# Patient Record
Sex: FEMALE | Race: WHITE | NOT HISPANIC OR LATINO | Employment: UNEMPLOYED | URBAN - METROPOLITAN AREA
[De-identification: names, ages, dates, MRNs, and addresses within clinical notes are randomized per-mention and may not be internally consistent; named-entity substitution may affect disease eponyms.]

---

## 2017-01-10 ENCOUNTER — ROUTINE PRENATAL (OUTPATIENT)
Dept: OBSTETRICS AND GYNECOLOGY | Facility: CLINIC | Age: 42
End: 2017-01-10

## 2017-01-10 VITALS — WEIGHT: 198 LBS | DIASTOLIC BLOOD PRESSURE: 70 MMHG | BODY MASS INDEX: 32.95 KG/M2 | SYSTOLIC BLOOD PRESSURE: 112 MMHG

## 2017-01-10 DIAGNOSIS — Z34.83 PRENATAL CARE, SUBSEQUENT PREGNANCY, THIRD TRIMESTER: Primary | ICD-10-CM

## 2017-01-10 LAB — EXTERNAL GROUP B STREP ANTIGEN: NEGATIVE

## 2017-01-10 PROCEDURE — 99213 OFFICE O/P EST LOW 20 MIN: CPT | Performed by: OBSTETRICS & GYNECOLOGY

## 2017-01-10 RX ORDER — LANSOPRAZOLE 30 MG/1
30 CAPSULE, DELAYED RELEASE ORAL DAILY
COMMUNITY
Start: 2016-12-31 | End: 2017-07-25

## 2017-01-10 NOTE — MR AVS SNAPSHOT
Rebekah Saucedo   1/10/2017 1:10 PM   Routine Prenatal    Dept Phone:  [phone]   Encounter #:  [id number]    Provider:  Tucker Huffman MD   Department:  [facility] [place]                Your Full Care Plan              Today's Medication Changes          These changes are accurate as of: 1/10/17  1:50 PM.  If you have any questions, ask your nurse or doctor.               Stop taking medication(s)listed here:     famotidine 20 MG tablet   Commonly known as:  PEPCID                      Your Updated Medication List          This list is accurate as of: 1/10/17  1:50 PM.  Always use your most recent med list.                ferrous sulfate 325 (65 FE) MG tablet   Take 1 tablet by mouth Daily With Breakfast.       lansoprazole 30 MG capsule   Commonly known as:  PREVACID       psyllium 58.6 % packet   Commonly known as:  METAMUCIL               You Were Diagnosed With        Codes Comments    Prenatal care, subsequent pregnancy, third trimester    -  Primary ICD-10-CM: Z34.83  ICD-9-CM: V22.1       Instructions     None    Patient Instructions History      Upcoming Appointments     Visit Type Date Time Department    OB FOLLOWUP 1/10/2017  1:10 PM [facility]      MyChart Signup     Our records indicate that you have declined [facility] iSentiumt signup. If you would like to sign up for My eShoe, please email [facility] [email or web address] or call [phone] to obtain an activation code.             Other Info from Your Visit           Allergies     No Known Allergies      Reason for Visit     Routine Prenatal Visit     Contractions fluid leaking earlier today      Vital Signs     Blood Pressure Weight Last Menstrual Period Body Mass Index Smoking Status       112/70 198 lb (89.8 kg) 05/07/2016 (Exact Date) 32.95 kg/m2 Current Some Day Smoker       Problems and Diagnoses Noted     Prenatal care    -  Primary

## 2017-01-10 NOTE — PROGRESS NOTES
Chief Complaint   Patient presents with   • Routine Prenatal Visit   • Contractions     fluid leaking earlier today       HPI: Rebekah is a  currently at 35w3d who today reports the following:  Contractions - YES - but less than 4/hour AND no associated change in vaginal discharge; Leaking - No; Vaginal bleeding -  No; Heartburn - No. Some discharge vs YOLANDA?    ROS:  Vitals: See prenatal flowsheet   GI: Nausea - No ; Constipation - No; Diarrhea - past week responded to Immodium   Neuro: Headache - No ; Visual change - No      EXAM:  Abdomen: See prenatal flowsheet   Urine glucose/protein: See prenatal flowsheet   Pelvic: See prenatal flowsheet     Lab Results   Component Value Date    ABO A 10/04/2016    RH Positive 10/04/2016    ABSCRN Negative 10/04/2016       MDM:  Impression: Supervision of pregnancy   Tests done today: GBS testing   Topics discussed: labor   Tests next visit: none   Next visit: History of rapid labor with 4th delivery; 9 lbs other children- will induce at 39 weeks also AMA

## 2017-01-17 ENCOUNTER — ROUTINE PRENATAL (OUTPATIENT)
Dept: OBSTETRICS AND GYNECOLOGY | Facility: CLINIC | Age: 42
End: 2017-01-17

## 2017-01-17 VITALS — WEIGHT: 199 LBS | DIASTOLIC BLOOD PRESSURE: 60 MMHG | SYSTOLIC BLOOD PRESSURE: 112 MMHG | BODY MASS INDEX: 33.12 KG/M2

## 2017-01-17 DIAGNOSIS — Z34.82 ENCOUNTER FOR SUPERVISION OF OTHER NORMAL PREGNANCY IN SECOND TRIMESTER: Primary | ICD-10-CM

## 2017-01-17 PROCEDURE — 99213 OFFICE O/P EST LOW 20 MIN: CPT | Performed by: OBSTETRICS & GYNECOLOGY

## 2017-01-17 NOTE — MR AVS SNAPSHOT
Rebekah Saucedo   1/17/2017 1:20 PM   Routine Prenatal    Dept Phone:  [phone]   Encounter #:  [id number]    Provider:  Tucker Huffman MD   Department:  [facility] [place]                Your Full Care Plan              Your Updated Medication List          This list is accurate as of: 1/17/17  1:54 PM.  Always use your most recent med list.                ferrous sulfate 325 (65 FE) MG tablet   Take 1 tablet by mouth Daily With Breakfast.       lansoprazole 30 MG capsule   Commonly known as:  PREVACID       psyllium 58.6 % packet   Commonly known as:  METAMUCIL               We Performed the Following     Group B Strep Culture       You Were Diagnosed With        Codes Comments    Encounter for supervision of other normal pregnancy in second trimester    -  Primary ICD-10-CM: Z34.82       Instructions     None    Patient Instructions History      Upcoming Appointments     Visit Type Date Time Department    OB FOLLOWUP 1/17/2017  1:20 PM [facility]      MyChart Signup     Our records indicate that you have declined [facility] LoopPayhart signup. If you would like to sign up for LightSquaredt, please email [facility] [email or web address] [email or web address] or call [phone] to obtain an activation code.             Other Info from Your Visit           Allergies     No Known Allergies      Reason for Visit     Routine Prenatal Visit itching on arms and shoulders    Contractions irreg      Vital Signs     Blood Pressure Weight Last Menstrual Period Body Mass Index Smoking Status       112/60 199 lb (90.3 kg) 05/07/2016 (Exact Date) 33.12 kg/m2 Current Some Day Smoker       Problems and Diagnoses Noted     Prenatal care      Results     Group B Strep Culture      Component    External Strep Group B Ag    Negative

## 2017-01-17 NOTE — PROGRESS NOTES
Chief Complaint   Patient presents with   • Routine Prenatal Visit     itching on arms and shoulders   • Contractions     irreg       HPI: Rebekah is a  currently at 36w3d who today reports the following: arms are itching - similar to last pregnancy   Contractions - YES - but less than 4/hour AND no associated change in vaginal discharge; Leaking - No; Vaginal bleeding -  No; Swelling of extremities - No.    ROS:  GI: Nausea - No; Constipation - No; Diarrhea - No    Neuro: Headache - No; Visual change - No      EXAM:  Vitals: See prenatal flowsheet   Abdomen: See prenatal flowsheet   Urine glucose/protein: See prenatal flowsheet   Pelvic: See prenatal flowsheet   MDM:  Impression: 1. Supervision of pregnancy  2. AMA   Tests done today: none   Topics discussed: 1. Continue with PNV's  2. Prenatal labs reviewed  3. labor signs and symptoms  4. pain management options for labor   Tests next visit: none   Next visit:  we'll discuss induction around 39 weeks which would be  so probably have to wait until the sixth Monday

## 2017-01-24 ENCOUNTER — ROUTINE PRENATAL (OUTPATIENT)
Dept: OBSTETRICS AND GYNECOLOGY | Facility: CLINIC | Age: 42
End: 2017-01-24

## 2017-01-24 VITALS — DIASTOLIC BLOOD PRESSURE: 60 MMHG | WEIGHT: 197 LBS | SYSTOLIC BLOOD PRESSURE: 112 MMHG | BODY MASS INDEX: 32.78 KG/M2

## 2017-01-24 DIAGNOSIS — L29.9 PRURITUS: ICD-10-CM

## 2017-01-24 DIAGNOSIS — O09.523 AMA (ADVANCED MATERNAL AGE) MULTIGRAVIDA 35+, THIRD TRIMESTER: Primary | ICD-10-CM

## 2017-01-24 DIAGNOSIS — Z34.82 ENCOUNTER FOR SUPERVISION OF OTHER NORMAL PREGNANCY IN SECOND TRIMESTER: ICD-10-CM

## 2017-01-24 PROCEDURE — 99213 OFFICE O/P EST LOW 20 MIN: CPT | Performed by: OBSTETRICS & GYNECOLOGY

## 2017-01-24 NOTE — MR AVS SNAPSHOT
Rebekah Saucedo   1/24/2017 11:20 AM   Routine Prenatal    Dept Phone:  [phone]   Encounter #:  [id number]    Provider:  Tucker Huffman MD   Department:  [facility] [place]                Your Full Care Plan              Your Updated Medication List          This list is accurate as of: 1/24/17 12:20 PM.  Always use your most recent med list.                ferrous sulfate 325 (65 FE) MG tablet   Take 1 tablet by mouth Daily With Breakfast.       lansoprazole 30 MG capsule   Commonly known as:  PREVACID       psyllium 58.6 % packet   Commonly known as:  METAMUCIL               You Were Diagnosed With        Codes Comments    AMA (advanced maternal age) multigravida 35+, third trimester    -  Primary ICD-10-CM: O09.523  ICD-9-CM: 659.63     Encounter for supervision of other normal pregnancy in second trimester     ICD-10-CM: Z34.82     Pruritus     ICD-10-CM: L29.9  ICD-9-CM: 698.9       Instructions     None    Patient Instructions History      Upcoming Appointments     Visit Type Date Time Department    OB FOLLOWUP 1/24/2017 11:20 AM [facility]      MyChart Signup     Our records indicate that you have declined [facility] TerraEchost signup. If you would like to sign up for [email or web address], please email [facility] [email or web address] or call [phone] to obtain an activation code.             Other Info from Your Visit           Allergies     No Known Allergies      Reason for Visit     Routine Prenatal Visit Itching all over / pelvic pressure /     Contractions           Vital Signs     Blood Pressure Weight Last Menstrual Period Body Mass Index Smoking Status       112/60 197 lb (89.4 kg) 05/07/2016 (Exact Date) 32.78 kg/m2 Current Some Day Smoker       Problems and Diagnoses Noted     AMA (advanced maternal age) multigravida 35+    Prenatal care    Itchy skin

## 2017-01-24 NOTE — PROGRESS NOTES
Chief Complaint   Patient presents with   • Routine Prenatal Visit     Itching all over / pelvic pressure /    • Contractions       HPI: Rebekah is a  currently at 37w3d who today reports the following:  Contractions - YES - but less than 4/hour AND no associated change in vaginal discharge; Leaking - No; Vaginal bleeding -  No; Swelling of extremities - YES hands mostly    ROS:                                   She is itching more now than before , also pressure   GI: Nausea - No; Constipation - No; Diarrhea - No    Neuro: Headache - No; Visual change - No      EXAM:  Vitals: See prenatal flowsheet   Abdomen: See prenatal flowsheet   Urine glucose/protein: See prenatal flowsheet   Pelvic: See prenatal flowsheet   MDM:  Impression: 1. Supervision of pregnancy  2. AMA  3. pruritis no rash good FM   Tests done today: bile salts   Topics discussed: 1. Continue with PNV's  2. Prenatal labs reviewed  3. itching    Tests next visit: none   Next visit: Okay to use benadryl - call if decreased Fm

## 2017-01-27 ENCOUNTER — TELEPHONE (OUTPATIENT)
Dept: OBSTETRICS AND GYNECOLOGY | Facility: CLINIC | Age: 42
End: 2017-01-27

## 2017-01-27 RX ORDER — METFORMIN HYDROCHLORIDE 750 MG/1
750 TABLET, EXTENDED RELEASE ORAL
Qty: 30 TABLET | Refills: 1 | Status: SHIPPED | OUTPATIENT
Start: 2017-01-27 | End: 2017-03-16

## 2017-01-27 NOTE — TELEPHONE ENCOUNTER
----- Message from Rafat Velazquez sent at 1/27/2017  1:24 PM EST -----  Contact: [phone]  WAS CALLING FOR TEST RESULTS.       [phone] advised patient Bile acids (11) are normal. Patient states she has been on Benadryl and Aveeno/oatmeal baths since Tuesday and it's not helping.

## 2017-01-27 NOTE — TELEPHONE ENCOUNTER
I electronically prescribed metformin 750 mg.numbers 30 with 1 refill.  Article suggested that this might be helpful.  Should not lower her blood sugars enough to be a problem.  I left a voicemail with the patient.

## 2017-01-29 ENCOUNTER — RESULTS ENCOUNTER (OUTPATIENT)
Dept: OBSTETRICS AND GYNECOLOGY | Facility: CLINIC | Age: 42
End: 2017-01-29

## 2017-01-29 DIAGNOSIS — Z34.82 ENCOUNTER FOR SUPERVISION OF OTHER NORMAL PREGNANCY IN SECOND TRIMESTER: ICD-10-CM

## 2017-01-29 DIAGNOSIS — L29.9 PRURITUS: ICD-10-CM

## 2017-01-31 ENCOUNTER — ROUTINE PRENATAL (OUTPATIENT)
Dept: OBSTETRICS AND GYNECOLOGY | Facility: CLINIC | Age: 42
End: 2017-01-31

## 2017-01-31 VITALS — BODY MASS INDEX: 33.12 KG/M2 | SYSTOLIC BLOOD PRESSURE: 110 MMHG | WEIGHT: 199 LBS | DIASTOLIC BLOOD PRESSURE: 60 MMHG

## 2017-01-31 DIAGNOSIS — L29.9 ITCHING: ICD-10-CM

## 2017-01-31 DIAGNOSIS — O09.523 AMA (ADVANCED MATERNAL AGE) MULTIGRAVIDA 35+, THIRD TRIMESTER: Primary | ICD-10-CM

## 2017-01-31 PROCEDURE — 99213 OFFICE O/P EST LOW 20 MIN: CPT | Performed by: OBSTETRICS & GYNECOLOGY

## 2017-01-31 RX ORDER — OXYTOCIN/RINGER'S LACTATE 20/1000 ML
999 PLASTIC BAG, INJECTION (ML) INTRAVENOUS ONCE
Status: CANCELLED | OUTPATIENT
Start: 2017-01-31 | End: 2017-01-31

## 2017-01-31 RX ORDER — PROMETHAZINE HYDROCHLORIDE 25 MG/ML
12.5 INJECTION, SOLUTION INTRAMUSCULAR; INTRAVENOUS EVERY 6 HOURS PRN
Status: CANCELLED | OUTPATIENT
Start: 2017-01-31

## 2017-01-31 RX ORDER — PROMETHAZINE HYDROCHLORIDE 25 MG/1
12.5 SUPPOSITORY RECTAL EVERY 6 HOURS PRN
Status: CANCELLED | OUTPATIENT
Start: 2017-01-31

## 2017-01-31 RX ORDER — METHYLERGONOVINE MALEATE 0.2 MG/ML
200 INJECTION INTRAVENOUS AS NEEDED
Status: CANCELLED | OUTPATIENT
Start: 2017-01-31

## 2017-01-31 RX ORDER — TERBUTALINE SULFATE 1 MG/ML
0.25 INJECTION, SOLUTION SUBCUTANEOUS AS NEEDED
Status: CANCELLED | OUTPATIENT
Start: 2017-01-31

## 2017-01-31 RX ORDER — ACETAMINOPHEN 325 MG/1
650 TABLET ORAL EVERY 4 HOURS PRN
Status: CANCELLED | OUTPATIENT
Start: 2017-01-31

## 2017-01-31 RX ORDER — ONDANSETRON 2 MG/ML
4 INJECTION INTRAMUSCULAR; INTRAVENOUS EVERY 6 HOURS PRN
Status: CANCELLED | OUTPATIENT
Start: 2017-01-31

## 2017-01-31 RX ORDER — HYDROCODONE BITARTRATE AND ACETAMINOPHEN 10; 325 MG/1; MG/1
2 TABLET ORAL EVERY 4 HOURS PRN
Status: CANCELLED | OUTPATIENT
Start: 2017-01-31 | End: 2017-02-10

## 2017-01-31 RX ORDER — OXYTOCIN/RINGER'S LACTATE 20/1000 ML
125 PLASTIC BAG, INJECTION (ML) INTRAVENOUS AS NEEDED
Status: CANCELLED | OUTPATIENT
Start: 2017-01-31 | End: 2017-02-01

## 2017-01-31 RX ORDER — LIDOCAINE HYDROCHLORIDE 10 MG/ML
5 INJECTION, SOLUTION INFILTRATION; PERINEURAL AS NEEDED
Status: CANCELLED | OUTPATIENT
Start: 2017-01-31

## 2017-01-31 RX ORDER — SODIUM CHLORIDE 0.9 % (FLUSH) 0.9 %
1-10 SYRINGE (ML) INJECTION AS NEEDED
Status: CANCELLED | OUTPATIENT
Start: 2017-01-31

## 2017-01-31 RX ORDER — PROMETHAZINE HYDROCHLORIDE 25 MG/1
12.5 TABLET ORAL EVERY 6 HOURS PRN
Status: CANCELLED | OUTPATIENT
Start: 2017-01-31

## 2017-01-31 RX ORDER — SODIUM CHLORIDE, SODIUM LACTATE, POTASSIUM CHLORIDE, CALCIUM CHLORIDE 600; 310; 30; 20 MG/100ML; MG/100ML; MG/100ML; MG/100ML
125 INJECTION, SOLUTION INTRAVENOUS CONTINUOUS
Status: CANCELLED | OUTPATIENT
Start: 2017-01-31

## 2017-01-31 RX ORDER — BUTORPHANOL TARTRATE 1 MG/ML
1 INJECTION, SOLUTION INTRAMUSCULAR; INTRAVENOUS
Status: CANCELLED | OUTPATIENT
Start: 2017-01-31

## 2017-01-31 RX ORDER — ONDANSETRON 4 MG/1
4 TABLET, FILM COATED ORAL EVERY 6 HOURS PRN
Status: CANCELLED | OUTPATIENT
Start: 2017-01-31

## 2017-01-31 RX ORDER — CARBOPROST TROMETHAMINE 250 UG/ML
250 INJECTION, SOLUTION INTRAMUSCULAR AS NEEDED
Status: CANCELLED | OUTPATIENT
Start: 2017-01-31

## 2017-01-31 RX ORDER — HYDROCODONE BITARTRATE AND ACETAMINOPHEN 5; 325 MG/1; MG/1
1 TABLET ORAL EVERY 4 HOURS PRN
Status: CANCELLED | OUTPATIENT
Start: 2017-01-31 | End: 2017-02-10

## 2017-01-31 RX ORDER — MISOPROSTOL 100 UG/1
800 TABLET ORAL AS NEEDED
Status: CANCELLED | OUTPATIENT
Start: 2017-01-31

## 2017-01-31 RX ORDER — BUTORPHANOL TARTRATE 1 MG/ML
2 INJECTION, SOLUTION INTRAMUSCULAR; INTRAVENOUS
Status: CANCELLED | OUTPATIENT
Start: 2017-01-31

## 2017-01-31 RX ORDER — OXYTOCIN/RINGER'S LACTATE 30/500 ML
2-24 PLASTIC BAG, INJECTION (ML) INTRAVENOUS
Status: CANCELLED | OUTPATIENT
Start: 2017-01-31

## 2017-01-31 NOTE — PROGRESS NOTES
Chief Complaint   Patient presents with   • Routine Prenatal Visit   • Contractions     irreg       HPI: Rebekah is a  currently at 38w3d who today reports the following: Still itching.  She only got her medicine yesterday and took 1 pill because her pharmacy did not have it.  Her bile salts/acids were relatively normal range.  She is feeling more pressure.  Contractions - No; Leaking - No; Vaginal bleeding -  No; Swelling of extremities - No.    ROS:  GI: Nausea - No; Constipation - No; Diarrhea - No    Neuro: Headache - No; Visual change - No      EXAM:  Vitals: See prenatal flowsheet   Abdomen: See prenatal flowsheet   Urine glucose/protein: See prenatal flowsheet   Pelvic: See prenatal flowsheet   MDM:  Impression: 1. Supervision of pregnancy  2. Possible cholestasis of pregnancy with normal bile salts  3. AMA   Tests done today: none   Topics discussed: 1. Continue with PNV's  2. Prenatal labs reviewed  3. labor signs and symptoms   Tests next visit: none   Next visit:  we'll check her cervix.  She is deathly changes from last week.

## 2017-01-31 NOTE — MR AVS SNAPSHOT
Rebekah Saucedo   1/31/2017 11:10 AM   Routine Prenatal    Dept Phone:  [phone]   Encounter #:  [id number]    Provider:  Tucker Huffman MD   Department:  [facility]                Your Full Care Plan              Your Updated Medication List          This list is accurate as of: 1/31/17 12:06 PM.  Always use your most recent med list.                ferrous sulfate 325 (65 FE) MG tablet   Take 1 tablet by mouth Daily With Breakfast.       lansoprazole 30 MG capsule   Commonly known as:  PREVACID       metFORMIN  MG 24 hr tablet   Commonly known as:  GLUCOPHAGE XR   Take 1 tablet by mouth Daily With Breakfast.       psyllium 58.6 % packet   Commonly known as:  METAMUCIL               You Were Diagnosed With        Codes Comments    AMA (advanced maternal age) multigravida 35+, third trimester    -  Primary ICD-10-CM: O09.523  ICD-9-CM: 659.63     Itching     ICD-10-CM: L29.9  ICD-9-CM: 698.9       Instructions     None    Patient Instructions History      Upcoming Appointments     Visit Type Date Time Department    OB FOLLOWUP 1/31/2017 11:10 AM [facility]      MyChart Signup     Our records indicate that you have declined [facility] [employer] signup. If you would like to sign up for [employer], please email [email or web address] or call [phone] to obtain an activation code.             Other Info from Your Visit           Allergies     No Known Allergies      Reason for Visit     Routine Prenatal Visit     Contractions irreg      Vital Signs     Blood Pressure Weight Last Menstrual Period Body Mass Index Smoking Status       110/60 199 lb (90.3 kg) 05/07/2016 (Exact Date) 33.12 kg/m2 Current Some Day Smoker       Problems and Diagnoses Noted     AMA (advanced maternal age) multigravida 35+    Itching          No Longer an Issue     Prenatal care

## 2017-02-03 ENCOUNTER — HOSPITAL ENCOUNTER (OUTPATIENT)
Facility: HOSPITAL | Age: 42
Setting detail: OBSERVATION
Discharge: HOME | End: 2017-02-03
Attending: OBSTETRICS & GYNECOLOGY | Admitting: OBSTETRICS & GYNECOLOGY

## 2017-02-03 VITALS
HEART RATE: 83 BPM | TEMPERATURE: 98.2 F | DIASTOLIC BLOOD PRESSURE: 72 MMHG | HEIGHT: 65 IN | WEIGHT: 199 LBS | BODY MASS INDEX: 33.15 KG/M2 | SYSTOLIC BLOOD PRESSURE: 122 MMHG

## 2017-02-03 LAB
ALP SERPL-CCNC: 198 U/L (ref 25–100)
ALT SERPL W P-5'-P-CCNC: 32 U/L (ref 7–40)
AST SERPL-CCNC: 29 U/L (ref 0–33)
BILIRUB SERPL-MCNC: 0.4 MG/DL (ref 0.3–1.2)
CREAT BLD-MCNC: 0.6 MG/DL (ref 0.6–1.3)
DEPRECATED RDW RBC AUTO: 43.3 FL (ref 37–54)
ERYTHROCYTE [DISTWIDTH] IN BLOOD BY AUTOMATED COUNT: 13.7 % (ref 11.3–14.5)
HCT VFR BLD AUTO: 35.3 % (ref 34.5–44)
HGB BLD-MCNC: 11.7 G/DL (ref 11.5–15.5)
LDH SERPL-CCNC: 202 U/L (ref 120–246)
MCH RBC QN AUTO: 28.7 PG (ref 27–31)
MCHC RBC AUTO-ENTMCNC: 33.1 G/DL (ref 32–36)
MCV RBC AUTO: 86.7 FL (ref 80–99)
PLATELET # BLD AUTO: 198 10*3/MM3 (ref 150–450)
PMV BLD AUTO: 10.5 FL (ref 6–12)
RBC # BLD AUTO: 4.07 10*6/MM3 (ref 3.89–5.14)
URATE SERPL-MCNC: 4.7 MG/DL (ref 3.1–7.8)
WBC NRBC COR # BLD: 10.68 10*3/MM3 (ref 3.5–10.8)

## 2017-02-03 PROCEDURE — 59025 FETAL NON-STRESS TEST: CPT | Performed by: OBSTETRICS & GYNECOLOGY

## 2017-02-03 PROCEDURE — 99218 PR INITIAL OBSERVATION CARE/DAY 30 MINUTES: CPT | Performed by: OBSTETRICS & GYNECOLOGY

## 2017-02-03 NOTE — PROGRESS NOTES
[facility]  Obstetric History and Physical    Chief Complaint   Patient presents with   • Contractions       Subjective     Patient is a 41 y.o. female  currently at 38w6d, who presents with an of possible leaking fluid this morning, without vaginal bleeding, abdominal pain, and reports normal fetal activity.  She admits to having some GI symptoms, diarrhea,occ nausea and denies fever.  Not any associated symptoms.  She states has had some generalized pruritus, negative bile acids    Her prenatal care is complicated by  advanced maternal age  ?.  Her previous obstetric/gynecological history is noted for is remarkable for .    The following portions of the patients history were reviewed and updated as appropriate: current medications, allergies, past medical history, past surgical history, past family history, past social history and problem list .       Prenatal Information:   Maternal Prenatal Labs  Blood Type No results found for: ABO   Rh Status No results found for: RH   Antibody Screen No results found for: ABSCRN   Gonnorhea No results found for: GCCX   Chlamydia No results found for: CLAMYDCU   RPR No results found for: RPR   Syphilis Antibody No results found for: SYPHILIS   Rubella No results found for: RUBELLAIGGIN   Hepatitis B Surface Antigen No results found for: HEPBSAG   HIV-1 Antibody No results found for: LABHIV1   Hepatitis C Antibody No results found for: HEPCAB   Rapid Urin Drug Screen No results found for: AMPMETHU, BARBITSCNUR, LABBENZSCN, LABMETHSCN, LABOPIASCN, THCURSCR, COCAINEUR, AMPHETSCREEN, PROPOXSCN, BUPRENORSCNU, METAMPSCNUR, OXYCODONESCN, TRICYCLICSCN   Group B Strep Culture No results found for: GBSANTIGEN           External Prenatal Results         Pregnancy Outside Results - these were transcribed from office records.  See scanned records for details. Date Time   Hgb      Hct      ABO      Rh      Antibody Screen      Glucose Fasting GTT      Glucose Tolerance Test 1 hour       Glucose Tolerance Test 3 hour      Gonorrhea (discrete)      Chlamydia (discrete)      RPR      VDRL      Syphillis Antibody      Rubella ^ Immune  16    HBsAg      Herpes Simplex Virus PCR      Herpes Simplex VIrus Culture      HIV ^ Negative  16    Hep C RNA Quant PCR      Hep C Antibody      Urine Drug Screen      AFP      Group B Strep ^ Negative  01/10/17    GBS Susceptibility to Clindamycin      GBS Susceptibility to Eythromycin      Fetal Fibronectin      Genetic Testing, Maternal Blood             Legend: ^: Historical            Past OB History:       Obstetric History       T7      TAB0   SAB1   E0   M0   L7       # Outcome Date GA Lbr Pasquale/2nd Weight Sex Delivery Anes PTL Lv   10 Current            9 Term 12 40w0d  7 lb (3.175 kg) F Vag-Spont   Y      Name: Araceli   8 AB 2008           7 Term 07 40w0d  8 lb 4 oz (3.742 kg) M Vag-Spont   Y      Name: Olu   6 Term 04 40w0d  7 lb 8 oz (3.402 kg) M Vag-Spont   Y      Name: Vicente   5 Term 01 40w0d  7 lb 1 oz (3.204 kg) F Vag-Spont  N Y      Name: Ruth   4 Term 99 40w0d  8 lb (3.629 kg) M Vag-Spont   Y      Name: Yoel   3 Term 11/15/96 40w0d  9 lb (4.082 kg) F Vag-Spont   Y      Name: Sonal   2 Term 95 40w0d  7 lb 3 oz (3.26 kg) F Vag-Spont   Y      Name: Angela   1 1992              Obstetric Comments   New paternity this pregnancy       Past Medical History: Past Medical History   Diagnosis Date   • Anemia    • Cervical spine fracture      with ex    • Endometriosis      10 years ago   • 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  She has a 2.00 pack-year smoking history. She does not have any smokeless tobacco history on file.   reports that she does not drink alcohol.   reports that she does not use illicit drugs.                   General ROS Negative Findings:Headaches, Visual Changes, Epigastric pain, Anorexia, Nausia/Vomiting and Vaginal Bleeding    ROS      Objective       Vital Signs Range for the last 24 hours  Temperature: Temp:  [98.2 °F (36.8 °C)] 98.2 °F (36.8 °C)   Temp Source: Temp src: Oral   BP: BP: (122)/(72) 122/72   Pulse: Heart Rate:  [83] 83   Respirations:     SPO2:     O2 Amount (l/min):     O2 Devices     Weight: Weight:  [199 lb (90.3 kg)] 199 lb (90.3 kg)     Physical Examination:   General:   alert, appears stated age and cooperative   Skin:   normal   HEENT:     Lungs:   clear to auscultation bilaterally   Heart:   regular rate and rhythm, S1, S2 normal, no murmur, click, rub or gallop   Abdomen:  soft, and uterus-nontender, no guarding, no rebound, negative CVA tenderness    Lower Extremeties    no edema, no calf tenderness, DTRs 2+ over 4, no clonus dorsalis pedis equal bilaterally    Pelvis:  External genitalia: normal general appearance  Vaginal: normal without tenderness, induration or masses  Uterus: enlarged                 Sterile speculum exam no pooling, negative nitrazine, negative ferning.      Presentation: vtx   Cervix: Exam by:     Dilation: Dilation: 3   Effacement: Cervical Effacement: 60%   Station: Station: -3       Fetal Heart Rate Assessment   Method:     Beats/min:     Baseline:     Varibility:     Accels:     Decels:     Tracing Category:     NST indications poss ROM, mod V, 130s reactive 15X15 no decelerations, onset  1018         Offset 1228, rare contractions  Uterine Assessment   Method: Method: TOCO (external toco transducer)   Frequency  (min):     Ctx Count in 10 min:     Duration:     Intensity:     Intensity by IUPC:     Resting Tone:     Resting Tone by IUPC:     [place] Units:       Laboratory Results: @erbtmkcs44@      Cbc pep normal  Radiology Review:@lastrad@  Other Studies:    Assessment/Plan     Active Problems:    AMA (advanced maternal age) multigravida 35+    Pregnancy    History of substance abuse        Assessment:  1.  Intrauterine pregnancy at 38w6d weeks gestation with reactive fetal status.    2.  No evidence of labor or rupture of membranes  3.  History of pruritus, normal bile acids, normal PEP, CBC  4.      Plan:  1. discharge to home  2. Plan of care has been reviewed with patient.  3.  Risks, benefits of treatment plan have been discussed.  4.  All questions have been answered.  5      Ted Golmdan DO  2/3/2017  12:30 PM

## 2017-02-03 NOTE — NURSING NOTE
1249-Discharge instructions given.  COme back for labor, SROM, decreased fetal movement, constant severe abdominal pain, bleeding that is more than spotting any other chagnes in maternal or fetal condition.  PT VU.  Questions answered denies need for WC ride.  Ambulatory to private vehicle

## 2017-02-06 ENCOUNTER — ANESTHESIA (OUTPATIENT)
Dept: LABOR AND DELIVERY | Facility: HOSPITAL | Age: 42
End: 2017-02-06

## 2017-02-06 ENCOUNTER — HOSPITAL ENCOUNTER (INPATIENT)
Facility: HOSPITAL | Age: 42
LOS: 2 days | Discharge: HOME | End: 2017-02-08
Attending: OBSTETRICS & GYNECOLOGY | Admitting: OBSTETRICS & GYNECOLOGY

## 2017-02-06 ENCOUNTER — ANESTHESIA EVENT (OUTPATIENT)
Dept: LABOR AND DELIVERY | Facility: HOSPITAL | Age: 42
End: 2017-02-06

## 2017-02-06 DIAGNOSIS — O09.523 AMA (ADVANCED MATERNAL AGE) MULTIGRAVIDA 35+, THIRD TRIMESTER: ICD-10-CM

## 2017-02-06 PROBLEM — Z34.90 TERM PREGNANCY: Status: ACTIVE | Noted: 2017-02-06

## 2017-02-06 LAB
ABO GROUP BLD: NORMAL
BLD GP AB SCN SERPL QL: NEGATIVE
DEPRECATED RDW RBC AUTO: 44.8 FL (ref 37–54)
DEPRECATED RDW RBC AUTO: 46.1 FL (ref 37–54)
ERYTHROCYTE [DISTWIDTH] IN BLOOD BY AUTOMATED COUNT: 14 % (ref 11.3–14.5)
ERYTHROCYTE [DISTWIDTH] IN BLOOD BY AUTOMATED COUNT: 14.1 % (ref 11.3–14.5)
HCT VFR BLD AUTO: 33.9 % (ref 34.5–44)
HCT VFR BLD AUTO: 35.1 % (ref 34.5–44)
HGB BLD-MCNC: 10.8 G/DL (ref 11.5–15.5)
HGB BLD-MCNC: 11.7 G/DL (ref 11.5–15.5)
MCH RBC QN AUTO: 28.4 PG (ref 27–31)
MCH RBC QN AUTO: 29.1 PG (ref 27–31)
MCHC RBC AUTO-ENTMCNC: 31.9 G/DL (ref 32–36)
MCHC RBC AUTO-ENTMCNC: 33.3 G/DL (ref 32–36)
MCV RBC AUTO: 87.3 FL (ref 80–99)
MCV RBC AUTO: 89.2 FL (ref 80–99)
PLATELET # BLD AUTO: 166 10*3/MM3 (ref 150–450)
PLATELET # BLD AUTO: 187 10*3/MM3 (ref 150–450)
PMV BLD AUTO: 10.2 FL (ref 6–12)
PMV BLD AUTO: 10.4 FL (ref 6–12)
RBC # BLD AUTO: 3.8 10*6/MM3 (ref 3.89–5.14)
RBC # BLD AUTO: 4.02 10*6/MM3 (ref 3.89–5.14)
RH BLD: POSITIVE
WBC NRBC COR # BLD: 10.13 10*3/MM3 (ref 3.5–10.8)
WBC NRBC COR # BLD: 12.22 10*3/MM3 (ref 3.5–10.8)

## 2017-02-06 PROCEDURE — 86850 RBC ANTIBODY SCREEN: CPT

## 2017-02-06 PROCEDURE — C1755 CATHETER, INTRASPINAL: HCPCS

## 2017-02-06 PROCEDURE — 25010000002 FENTANYL CITRATE (PF) 100 MCG/2ML SOLUTION: Performed by: NURSE ANESTHETIST, CERTIFIED REGISTERED

## 2017-02-06 PROCEDURE — 85027 COMPLETE CBC AUTOMATED: CPT | Performed by: OBSTETRICS & GYNECOLOGY

## 2017-02-06 PROCEDURE — 25010000002 ROPIVACAINE PER 1 MG: Performed by: NURSE ANESTHETIST, CERTIFIED REGISTERED

## 2017-02-06 PROCEDURE — 86901 BLOOD TYPING SEROLOGIC RH(D): CPT

## 2017-02-06 PROCEDURE — 59409 OBSTETRICAL CARE: CPT | Performed by: OBSTETRICS & GYNECOLOGY

## 2017-02-06 PROCEDURE — C1755 CATHETER, INTRASPINAL: HCPCS | Performed by: ANESTHESIOLOGY

## 2017-02-06 PROCEDURE — 86900 BLOOD TYPING SEROLOGIC ABO: CPT

## 2017-02-06 PROCEDURE — 10907ZC DRAINAGE OF AMNIOTIC FLUID, THERAPEUTIC FROM PRODUCTS OF CONCEPTION, VIA NATURAL OR ARTIFICIAL OPENING: ICD-10-PCS | Performed by: OBSTETRICS & GYNECOLOGY

## 2017-02-06 PROCEDURE — 25010000002 METHYLERGONOVINE MALEATE PER 0.2 MG: Performed by: OBSTETRICS & GYNECOLOGY

## 2017-02-06 PROCEDURE — 59025 FETAL NON-STRESS TEST: CPT

## 2017-02-06 RX ORDER — HYDROCODONE BITARTRATE AND ACETAMINOPHEN 5; 325 MG/1; MG/1
1 TABLET ORAL EVERY 4 HOURS PRN
Status: DISCONTINUED | OUTPATIENT
Start: 2017-02-06 | End: 2017-02-06 | Stop reason: HOSPADM

## 2017-02-06 RX ORDER — FAMOTIDINE 10 MG/ML
20 INJECTION, SOLUTION INTRAVENOUS ONCE AS NEEDED
Status: DISCONTINUED | OUTPATIENT
Start: 2017-02-06 | End: 2017-02-06 | Stop reason: HOSPADM

## 2017-02-06 RX ORDER — CARBOPROST TROMETHAMINE 250 UG/ML
250 INJECTION, SOLUTION INTRAMUSCULAR AS NEEDED
Status: DISCONTINUED | OUTPATIENT
Start: 2017-02-06 | End: 2017-02-06 | Stop reason: HOSPADM

## 2017-02-06 RX ORDER — MISOPROSTOL 200 UG/1
600 TABLET ORAL ONCE
Status: DISCONTINUED | OUTPATIENT
Start: 2017-02-06 | End: 2017-02-06

## 2017-02-06 RX ORDER — ONDANSETRON 4 MG/1
4 TABLET, FILM COATED ORAL EVERY 6 HOURS PRN
Status: DISCONTINUED | OUTPATIENT
Start: 2017-02-06 | End: 2017-02-06

## 2017-02-06 RX ORDER — PROMETHAZINE HYDROCHLORIDE 12.5 MG/1
12.5 SUPPOSITORY RECTAL EVERY 6 HOURS PRN
Status: DISCONTINUED | OUTPATIENT
Start: 2017-02-06 | End: 2017-02-06

## 2017-02-06 RX ORDER — PROMETHAZINE HYDROCHLORIDE 25 MG/ML
12.5 INJECTION, SOLUTION INTRAMUSCULAR; INTRAVENOUS EVERY 6 HOURS PRN
Status: DISCONTINUED | OUTPATIENT
Start: 2017-02-06 | End: 2017-02-08 | Stop reason: HOSPADM

## 2017-02-06 RX ORDER — OXYTOCIN/RINGER'S LACTATE 20/1000 ML
999 PLASTIC BAG, INJECTION (ML) INTRAVENOUS ONCE
Status: DISCONTINUED | OUTPATIENT
Start: 2017-02-06 | End: 2017-02-08 | Stop reason: HOSPADM

## 2017-02-06 RX ORDER — PROMETHAZINE HYDROCHLORIDE 12.5 MG/1
12.5 TABLET ORAL EVERY 6 HOURS PRN
Status: DISCONTINUED | OUTPATIENT
Start: 2017-02-06 | End: 2017-02-08 | Stop reason: HOSPADM

## 2017-02-06 RX ORDER — SODIUM CHLORIDE 0.9 % (FLUSH) 0.9 %
1-10 SYRINGE (ML) INJECTION AS NEEDED
Status: DISCONTINUED | OUTPATIENT
Start: 2017-02-06 | End: 2017-02-06

## 2017-02-06 RX ORDER — ACETAMINOPHEN 650 MG/1
650 SUPPOSITORY RECTAL EVERY 4 HOURS PRN
Status: DISCONTINUED | OUTPATIENT
Start: 2017-02-06 | End: 2017-02-06 | Stop reason: HOSPADM

## 2017-02-06 RX ORDER — PROMETHAZINE HYDROCHLORIDE 12.5 MG/1
12.5 SUPPOSITORY RECTAL EVERY 6 HOURS PRN
Status: DISCONTINUED | OUTPATIENT
Start: 2017-02-06 | End: 2017-02-06 | Stop reason: HOSPADM

## 2017-02-06 RX ORDER — OXYTOCIN/RINGER'S LACTATE 30/500 ML
2-24 PLASTIC BAG, INJECTION (ML) INTRAVENOUS
Status: DISCONTINUED | OUTPATIENT
Start: 2017-02-06 | End: 2017-02-06

## 2017-02-06 RX ORDER — KETOROLAC TROMETHAMINE 30 MG/ML
30 INJECTION, SOLUTION INTRAMUSCULAR; INTRAVENOUS EVERY 6 HOURS PRN
Status: DISCONTINUED | OUTPATIENT
Start: 2017-02-06 | End: 2017-02-06 | Stop reason: HOSPADM

## 2017-02-06 RX ORDER — METOCLOPRAMIDE HYDROCHLORIDE 5 MG/ML
10 INJECTION INTRAMUSCULAR; INTRAVENOUS ONCE AS NEEDED
Status: DISCONTINUED | OUTPATIENT
Start: 2017-02-06 | End: 2017-02-06 | Stop reason: HOSPADM

## 2017-02-06 RX ORDER — PROMETHAZINE HYDROCHLORIDE 25 MG/ML
12.5 INJECTION, SOLUTION INTRAMUSCULAR; INTRAVENOUS EVERY 6 HOURS PRN
Status: DISCONTINUED | OUTPATIENT
Start: 2017-02-06 | End: 2017-02-06

## 2017-02-06 RX ORDER — ONDANSETRON 2 MG/ML
4 INJECTION INTRAMUSCULAR; INTRAVENOUS EVERY 6 HOURS PRN
Status: DISCONTINUED | OUTPATIENT
Start: 2017-02-06 | End: 2017-02-06

## 2017-02-06 RX ORDER — MISOPROSTOL 200 UG/1
800 TABLET ORAL AS NEEDED
Status: DISCONTINUED | OUTPATIENT
Start: 2017-02-06 | End: 2017-02-06 | Stop reason: HOSPADM

## 2017-02-06 RX ORDER — METHYLERGONOVINE MALEATE 0.2 MG/ML
200 INJECTION INTRAVENOUS AS NEEDED
Status: DISCONTINUED | OUTPATIENT
Start: 2017-02-06 | End: 2017-02-08 | Stop reason: HOSPADM

## 2017-02-06 RX ORDER — LIDOCAINE HYDROCHLORIDE AND EPINEPHRINE 15; 5 MG/ML; UG/ML
INJECTION, SOLUTION EPIDURAL AS NEEDED
Status: DISCONTINUED | OUTPATIENT
Start: 2017-02-06 | End: 2017-02-06 | Stop reason: SURG

## 2017-02-06 RX ORDER — PROMETHAZINE HYDROCHLORIDE 25 MG/1
25 TABLET ORAL EVERY 6 HOURS PRN
Status: DISCONTINUED | OUTPATIENT
Start: 2017-02-06 | End: 2017-02-06 | Stop reason: HOSPADM

## 2017-02-06 RX ORDER — SODIUM CHLORIDE 0.9 % (FLUSH) 0.9 %
1-10 SYRINGE (ML) INJECTION AS NEEDED
Status: DISCONTINUED | OUTPATIENT
Start: 2017-02-06 | End: 2017-02-06 | Stop reason: HOSPADM

## 2017-02-06 RX ORDER — HYDROCODONE BITARTRATE AND ACETAMINOPHEN 5; 325 MG/1; MG/1
1 TABLET ORAL EVERY 4 HOURS PRN
Status: DISCONTINUED | OUTPATIENT
Start: 2017-02-06 | End: 2017-02-08 | Stop reason: HOSPADM

## 2017-02-06 RX ORDER — PRENATAL WITH FERROUS FUM AND FOLIC ACID 3080; 920; 120; 400; 22; 1.84; 3; 20; 10; 1; 12; 200; 27; 25; 2 [IU]/1; [IU]/1; MG/1; [IU]/1; MG/1; MG/1; MG/1; MG/1; MG/1; MG/1; UG/1; MG/1; MG/1; MG/1; MG/1
1 TABLET ORAL DAILY
Status: DISCONTINUED | OUTPATIENT
Start: 2017-02-06 | End: 2017-02-06 | Stop reason: HOSPADM

## 2017-02-06 RX ORDER — HYDROCODONE BITARTRATE AND ACETAMINOPHEN 7.5; 325 MG/1; MG/1
1 TABLET ORAL EVERY 4 HOURS PRN
Status: DISCONTINUED | OUTPATIENT
Start: 2017-02-06 | End: 2017-02-06 | Stop reason: HOSPADM

## 2017-02-06 RX ORDER — FERROUS SULFATE 325(65) MG
325 TABLET ORAL
Status: DISCONTINUED | OUTPATIENT
Start: 2017-02-07 | End: 2017-02-08 | Stop reason: HOSPADM

## 2017-02-06 RX ORDER — FAMOTIDINE 10 MG/ML
20 INJECTION, SOLUTION INTRAVENOUS ONCE AS NEEDED
Status: DISCONTINUED | OUTPATIENT
Start: 2017-02-06 | End: 2017-02-06

## 2017-02-06 RX ORDER — BISACODYL 10 MG
10 SUPPOSITORY, RECTAL RECTAL DAILY PRN
Status: DISCONTINUED | OUTPATIENT
Start: 2017-02-07 | End: 2017-02-06 | Stop reason: HOSPADM

## 2017-02-06 RX ORDER — ONDANSETRON 2 MG/ML
4 INJECTION INTRAMUSCULAR; INTRAVENOUS EVERY 6 HOURS PRN
Status: DISCONTINUED | OUTPATIENT
Start: 2017-02-06 | End: 2017-02-06 | Stop reason: HOSPADM

## 2017-02-06 RX ORDER — PROMETHAZINE HYDROCHLORIDE 12.5 MG/1
12.5 TABLET ORAL EVERY 6 HOURS PRN
Status: DISCONTINUED | OUTPATIENT
Start: 2017-02-06 | End: 2017-02-06

## 2017-02-06 RX ORDER — ONDANSETRON 4 MG/1
4 TABLET, FILM COATED ORAL EVERY 6 HOURS PRN
Status: DISCONTINUED | OUTPATIENT
Start: 2017-02-06 | End: 2017-02-08 | Stop reason: HOSPADM

## 2017-02-06 RX ORDER — ONDANSETRON 4 MG/1
4 TABLET, FILM COATED ORAL EVERY 6 HOURS PRN
Status: DISCONTINUED | OUTPATIENT
Start: 2017-02-06 | End: 2017-02-06 | Stop reason: HOSPADM

## 2017-02-06 RX ORDER — ONDANSETRON 4 MG/1
4 TABLET, FILM COATED ORAL EVERY 6 HOURS PRN
Status: DISCONTINUED | OUTPATIENT
Start: 2017-02-06 | End: 2017-02-06 | Stop reason: SDUPTHER

## 2017-02-06 RX ORDER — ACETAMINOPHEN 325 MG/1
650 TABLET ORAL EVERY 4 HOURS PRN
Status: DISCONTINUED | OUTPATIENT
Start: 2017-02-06 | End: 2017-02-06

## 2017-02-06 RX ORDER — GUAIFENESIN 600 MG/1
600 TABLET, EXTENDED RELEASE ORAL EVERY 12 HOURS SCHEDULED
Status: DISCONTINUED | OUTPATIENT
Start: 2017-02-06 | End: 2017-02-08 | Stop reason: HOSPADM

## 2017-02-06 RX ORDER — OXYTOCIN/RINGER'S LACTATE 20/1000 ML
1000 PLASTIC BAG, INJECTION (ML) INTRAVENOUS CONTINUOUS
Status: ACTIVE | OUTPATIENT
Start: 2017-02-06 | End: 2017-02-06

## 2017-02-06 RX ORDER — EPHEDRINE SULFATE/0.9% NACL/PF 50 MG/10ML
10 SYRINGE (ML) INTRAVENOUS
Status: DISCONTINUED | OUTPATIENT
Start: 2017-02-06 | End: 2017-02-06 | Stop reason: HOSPADM

## 2017-02-06 RX ORDER — FENTANYL CITRATE 50 UG/ML
INJECTION, SOLUTION INTRAMUSCULAR; INTRAVENOUS AS NEEDED
Status: DISCONTINUED | OUTPATIENT
Start: 2017-02-06 | End: 2017-02-06 | Stop reason: SURG

## 2017-02-06 RX ORDER — PROMETHAZINE HYDROCHLORIDE 12.5 MG/1
12.5 TABLET ORAL EVERY 4 HOURS PRN
Status: DISCONTINUED | OUTPATIENT
Start: 2017-02-06 | End: 2017-02-06

## 2017-02-06 RX ORDER — PROMETHAZINE HYDROCHLORIDE 12.5 MG/1
12.5 TABLET ORAL EVERY 6 HOURS PRN
Status: DISCONTINUED | OUTPATIENT
Start: 2017-02-06 | End: 2017-02-06 | Stop reason: SDUPTHER

## 2017-02-06 RX ORDER — LIDOCAINE HYDROCHLORIDE 10 MG/ML
5 INJECTION, SOLUTION INFILTRATION; PERINEURAL AS NEEDED
Status: DISCONTINUED | OUTPATIENT
Start: 2017-02-06 | End: 2017-02-06 | Stop reason: HOSPADM

## 2017-02-06 RX ORDER — HYDROCODONE BITARTRATE AND ACETAMINOPHEN 5; 325 MG/1; MG/1
1 TABLET ORAL EVERY 4 HOURS PRN
Status: DISCONTINUED | OUTPATIENT
Start: 2017-02-06 | End: 2017-02-06

## 2017-02-06 RX ORDER — CARBOPROST TROMETHAMINE 250 UG/ML
250 INJECTION, SOLUTION INTRAMUSCULAR AS NEEDED
Status: DISCONTINUED | OUTPATIENT
Start: 2017-02-06 | End: 2017-02-08 | Stop reason: HOSPADM

## 2017-02-06 RX ORDER — ACETAMINOPHEN 325 MG/1
650 TABLET ORAL EVERY 4 HOURS PRN
Status: DISCONTINUED | OUTPATIENT
Start: 2017-02-06 | End: 2017-02-06 | Stop reason: HOSPADM

## 2017-02-06 RX ORDER — ZOLPIDEM TARTRATE 5 MG/1
5 TABLET ORAL NIGHTLY PRN
Status: DISCONTINUED | OUTPATIENT
Start: 2017-02-06 | End: 2017-02-06 | Stop reason: HOSPADM

## 2017-02-06 RX ORDER — DOCUSATE SODIUM 100 MG/1
100 CAPSULE, LIQUID FILLED ORAL 2 TIMES DAILY
Status: DISCONTINUED | OUTPATIENT
Start: 2017-02-06 | End: 2017-02-08 | Stop reason: HOSPADM

## 2017-02-06 RX ORDER — SODIUM CHLORIDE, SODIUM LACTATE, POTASSIUM CHLORIDE, CALCIUM CHLORIDE 600; 310; 30; 20 MG/100ML; MG/100ML; MG/100ML; MG/100ML
125 INJECTION, SOLUTION INTRAVENOUS CONTINUOUS
Status: DISCONTINUED | OUTPATIENT
Start: 2017-02-06 | End: 2017-02-06

## 2017-02-06 RX ORDER — HYDROCODONE BITARTRATE AND ACETAMINOPHEN 10; 325 MG/1; MG/1
2 TABLET ORAL EVERY 4 HOURS PRN
Status: DISCONTINUED | OUTPATIENT
Start: 2017-02-06 | End: 2017-02-08 | Stop reason: HOSPADM

## 2017-02-06 RX ORDER — BUTORPHANOL TARTRATE 1 MG/ML
1 INJECTION, SOLUTION INTRAMUSCULAR; INTRAVENOUS
Status: DISCONTINUED | OUTPATIENT
Start: 2017-02-06 | End: 2017-02-06

## 2017-02-06 RX ORDER — ONDANSETRON 2 MG/ML
4 INJECTION INTRAMUSCULAR; INTRAVENOUS EVERY 6 HOURS PRN
Status: DISCONTINUED | OUTPATIENT
Start: 2017-02-06 | End: 2017-02-06 | Stop reason: SDUPTHER

## 2017-02-06 RX ORDER — BISACODYL 10 MG
10 SUPPOSITORY, RECTAL RECTAL DAILY PRN
Status: DISCONTINUED | OUTPATIENT
Start: 2017-02-07 | End: 2017-02-08 | Stop reason: HOSPADM

## 2017-02-06 RX ORDER — OXYTOCIN/RINGER'S LACTATE 30/500 ML
2-24 PLASTIC BAG, INJECTION (ML) INTRAVENOUS
Status: DISCONTINUED | OUTPATIENT
Start: 2017-02-06 | End: 2017-02-06 | Stop reason: HOSPADM

## 2017-02-06 RX ORDER — TERBUTALINE SULFATE 1 MG/ML
0.25 INJECTION, SOLUTION SUBCUTANEOUS AS NEEDED
Status: DISCONTINUED | OUTPATIENT
Start: 2017-02-06 | End: 2017-02-06

## 2017-02-06 RX ORDER — IBUPROFEN 600 MG/1
600 TABLET ORAL EVERY 8 HOURS PRN
Status: DISCONTINUED | OUTPATIENT
Start: 2017-02-06 | End: 2017-02-08 | Stop reason: HOSPADM

## 2017-02-06 RX ORDER — PROMETHAZINE HYDROCHLORIDE 12.5 MG/1
12.5 SUPPOSITORY RECTAL EVERY 6 HOURS PRN
Status: DISCONTINUED | OUTPATIENT
Start: 2017-02-06 | End: 2017-02-08 | Stop reason: HOSPADM

## 2017-02-06 RX ORDER — ONDANSETRON 2 MG/ML
4 INJECTION INTRAMUSCULAR; INTRAVENOUS ONCE AS NEEDED
Status: DISCONTINUED | OUTPATIENT
Start: 2017-02-06 | End: 2017-02-06 | Stop reason: HOSPADM

## 2017-02-06 RX ORDER — LANOLIN 100 %
OINTMENT (GRAM) TOPICAL
Status: DISCONTINUED | OUTPATIENT
Start: 2017-02-06 | End: 2017-02-08 | Stop reason: HOSPADM

## 2017-02-06 RX ORDER — MISOPROSTOL 200 UG/1
800 TABLET ORAL AS NEEDED
Status: DISCONTINUED | OUTPATIENT
Start: 2017-02-06 | End: 2017-02-08 | Stop reason: HOSPADM

## 2017-02-06 RX ORDER — DIPHENHYDRAMINE HYDROCHLORIDE 50 MG/ML
12.5 INJECTION INTRAMUSCULAR; INTRAVENOUS EVERY 8 HOURS PRN
Status: DISCONTINUED | OUTPATIENT
Start: 2017-02-06 | End: 2017-02-06 | Stop reason: HOSPADM

## 2017-02-06 RX ORDER — OXYTOCIN/RINGER'S LACTATE 20/1000 ML
125 PLASTIC BAG, INJECTION (ML) INTRAVENOUS AS NEEDED
Status: DISCONTINUED | OUTPATIENT
Start: 2017-02-06 | End: 2017-02-06 | Stop reason: HOSPADM

## 2017-02-06 RX ORDER — ONDANSETRON 2 MG/ML
4 INJECTION INTRAMUSCULAR; INTRAVENOUS EVERY 6 HOURS PRN
Status: DISCONTINUED | OUTPATIENT
Start: 2017-02-06 | End: 2017-02-08 | Stop reason: HOSPADM

## 2017-02-06 RX ORDER — ZOLPIDEM TARTRATE 5 MG/1
5 TABLET ORAL NIGHTLY PRN
Status: DISCONTINUED | OUTPATIENT
Start: 2017-02-06 | End: 2017-02-08 | Stop reason: HOSPADM

## 2017-02-06 RX ORDER — PROMETHAZINE HYDROCHLORIDE 25 MG/ML
12.5 INJECTION, SOLUTION INTRAMUSCULAR; INTRAVENOUS EVERY 6 HOURS PRN
Status: DISCONTINUED | OUTPATIENT
Start: 2017-02-06 | End: 2017-02-06 | Stop reason: SDUPTHER

## 2017-02-06 RX ORDER — IBUPROFEN 600 MG/1
600 TABLET ORAL EVERY 8 HOURS PRN
Status: DISCONTINUED | OUTPATIENT
Start: 2017-02-07 | End: 2017-02-06 | Stop reason: HOSPADM

## 2017-02-06 RX ORDER — HYDROCODONE BITARTRATE AND ACETAMINOPHEN 10; 325 MG/1; MG/1
2 TABLET ORAL EVERY 4 HOURS PRN
Status: DISCONTINUED | OUTPATIENT
Start: 2017-02-06 | End: 2017-02-06 | Stop reason: SDUPTHER

## 2017-02-06 RX ORDER — HYDROCODONE BITARTRATE AND ACETAMINOPHEN 10; 325 MG/1; MG/1
2 TABLET ORAL EVERY 4 HOURS PRN
Status: DISCONTINUED | OUTPATIENT
Start: 2017-02-06 | End: 2017-02-06

## 2017-02-06 RX ORDER — OXYTOCIN/RINGER'S LACTATE 20/1000 ML
999 PLASTIC BAG, INJECTION (ML) INTRAVENOUS ONCE
Status: DISCONTINUED | OUTPATIENT
Start: 2017-02-06 | End: 2017-02-06 | Stop reason: HOSPADM

## 2017-02-06 RX ORDER — PSEUDOEPHEDRINE HCL 30 MG
30 TABLET ORAL EVERY 6 HOURS PRN
Status: DISCONTINUED | OUTPATIENT
Start: 2017-02-06 | End: 2017-02-08 | Stop reason: HOSPADM

## 2017-02-06 RX ORDER — METHYLERGONOVINE MALEATE 0.2 MG/ML
200 INJECTION INTRAVENOUS AS NEEDED
Status: DISCONTINUED | OUTPATIENT
Start: 2017-02-06 | End: 2017-02-06 | Stop reason: HOSPADM

## 2017-02-06 RX ORDER — SODIUM CHLORIDE 0.9 % (FLUSH) 0.9 %
1-10 SYRINGE (ML) INJECTION AS NEEDED
Status: DISCONTINUED | OUTPATIENT
Start: 2017-02-06 | End: 2017-02-08 | Stop reason: HOSPADM

## 2017-02-06 RX ORDER — TERBUTALINE SULFATE 1 MG/ML
0.25 INJECTION, SOLUTION SUBCUTANEOUS AS NEEDED
Status: DISCONTINUED | OUTPATIENT
Start: 2017-02-06 | End: 2017-02-06 | Stop reason: HOSPADM

## 2017-02-06 RX ORDER — METHYLERGONOVINE MALEATE 0.2 MG/ML
200 INJECTION INTRAVENOUS ONCE
Status: DISCONTINUED | OUTPATIENT
Start: 2017-02-06 | End: 2017-02-06

## 2017-02-06 RX ORDER — FAMOTIDINE 20 MG/1
20 TABLET, FILM COATED ORAL ONCE AS NEEDED
Status: DISCONTINUED | OUTPATIENT
Start: 2017-02-06 | End: 2017-02-06

## 2017-02-06 RX ORDER — OXYTOCIN/RINGER'S LACTATE 20/1000 ML
125 PLASTIC BAG, INJECTION (ML) INTRAVENOUS AS NEEDED
Status: ACTIVE | OUTPATIENT
Start: 2017-02-06 | End: 2017-02-07

## 2017-02-06 RX ORDER — IBUPROFEN 600 MG/1
600 TABLET ORAL EVERY 8 HOURS PRN
Status: DISCONTINUED | OUTPATIENT
Start: 2017-02-06 | End: 2017-02-06

## 2017-02-06 RX ORDER — SODIUM CHLORIDE, SODIUM LACTATE, POTASSIUM CHLORIDE, CALCIUM CHLORIDE 600; 310; 30; 20 MG/100ML; MG/100ML; MG/100ML; MG/100ML
125 INJECTION, SOLUTION INTRAVENOUS CONTINUOUS
Status: DISCONTINUED | OUTPATIENT
Start: 2017-02-06 | End: 2017-02-08 | Stop reason: HOSPADM

## 2017-02-06 RX ORDER — PROMETHAZINE HYDROCHLORIDE 25 MG/ML
12.5 INJECTION, SOLUTION INTRAMUSCULAR; INTRAVENOUS EVERY 4 HOURS PRN
Status: DISCONTINUED | OUTPATIENT
Start: 2017-02-06 | End: 2017-02-06

## 2017-02-06 RX ORDER — PROMETHAZINE HYDROCHLORIDE 12.5 MG/1
12.5 SUPPOSITORY RECTAL EVERY 6 HOURS PRN
Status: DISCONTINUED | OUTPATIENT
Start: 2017-02-06 | End: 2017-02-06 | Stop reason: SDUPTHER

## 2017-02-06 RX ORDER — LIDOCAINE HYDROCHLORIDE 10 MG/ML
5 INJECTION, SOLUTION INFILTRATION; PERINEURAL AS NEEDED
Status: DISCONTINUED | OUTPATIENT
Start: 2017-02-06 | End: 2017-02-08 | Stop reason: HOSPADM

## 2017-02-06 RX ORDER — ACETAMINOPHEN 325 MG/1
650 TABLET ORAL EVERY 4 HOURS PRN
Status: DISCONTINUED | OUTPATIENT
Start: 2017-02-06 | End: 2017-02-08 | Stop reason: HOSPADM

## 2017-02-06 RX ORDER — DOCUSATE SODIUM 100 MG/1
100 CAPSULE, LIQUID FILLED ORAL 2 TIMES DAILY
Status: DISCONTINUED | OUTPATIENT
Start: 2017-02-06 | End: 2017-02-06 | Stop reason: HOSPADM

## 2017-02-06 RX ORDER — OXYCODONE HYDROCHLORIDE AND ACETAMINOPHEN 5; 325 MG/1; MG/1
1 TABLET ORAL EVERY 4 HOURS PRN
Status: DISCONTINUED | OUTPATIENT
Start: 2017-02-06 | End: 2017-02-08 | Stop reason: HOSPADM

## 2017-02-06 RX ORDER — PROMETHAZINE HYDROCHLORIDE 25 MG/ML
12.5 INJECTION, SOLUTION INTRAMUSCULAR; INTRAVENOUS EVERY 6 HOURS PRN
Status: DISCONTINUED | OUTPATIENT
Start: 2017-02-06 | End: 2017-02-06 | Stop reason: HOSPADM

## 2017-02-06 RX ORDER — PANTOPRAZOLE SODIUM 40 MG/1
40 TABLET, DELAYED RELEASE ORAL
Status: DISCONTINUED | OUTPATIENT
Start: 2017-02-07 | End: 2017-02-08 | Stop reason: HOSPADM

## 2017-02-06 RX ORDER — CARBOPROST TROMETHAMINE 250 UG/ML
250 INJECTION, SOLUTION INTRAMUSCULAR ONCE
Status: DISCONTINUED | OUTPATIENT
Start: 2017-02-06 | End: 2017-02-06

## 2017-02-06 RX ORDER — LANOLIN 100 %
OINTMENT (GRAM) TOPICAL
Status: DISCONTINUED | OUTPATIENT
Start: 2017-02-06 | End: 2017-02-06 | Stop reason: HOSPADM

## 2017-02-06 RX ADMIN — HYDROCODONE BITARTRATE AND ACETAMINOPHEN 1 TABLET: 10; 325 TABLET ORAL at 20:41

## 2017-02-06 RX ADMIN — GUAIFENESIN 600 MG: 600 TABLET, EXTENDED RELEASE ORAL at 21:05

## 2017-02-06 RX ADMIN — ROPIVACAINE HYDROCHLORIDE 10 ML: 5 INJECTION, SOLUTION EPIDURAL; INFILTRATION; PERINEURAL at 10:45

## 2017-02-06 RX ADMIN — SODIUM CHLORIDE, POTASSIUM CHLORIDE, SODIUM LACTATE AND CALCIUM CHLORIDE 1000 ML: 600; 310; 30; 20 INJECTION, SOLUTION INTRAVENOUS at 10:15

## 2017-02-06 RX ADMIN — IBUPROFEN 600 MG: 600 TABLET ORAL at 15:01

## 2017-02-06 RX ADMIN — HYDROCORTISONE 2.5% 1 APPLICATION: 25 CREAM TOPICAL at 16:53

## 2017-02-06 RX ADMIN — FENTANYL CITRATE 100 MCG: 50 INJECTION, SOLUTION INTRAMUSCULAR; INTRAVENOUS at 10:43

## 2017-02-06 RX ADMIN — IBUPROFEN 600 MG: 600 TABLET ORAL at 22:14

## 2017-02-06 RX ADMIN — METHYLERGONOVINE MALEATE 200 MCG: 0.2 INJECTION INTRAMUSCULAR; INTRAVENOUS at 13:03

## 2017-02-06 RX ADMIN — SODIUM CHLORIDE, POTASSIUM CHLORIDE, SODIUM LACTATE AND CALCIUM CHLORIDE 125 ML/HR: 600; 310; 30; 20 INJECTION, SOLUTION INTRAVENOUS at 11:19

## 2017-02-06 RX ADMIN — OXYCODONE AND ACETAMINOPHEN 1 TABLET: 5; 325 TABLET ORAL at 16:23

## 2017-02-06 RX ADMIN — LIDOCAINE HYDROCHLORIDE AND EPINEPHRINE 3 ML: 15; 5 INJECTION, SOLUTION EPIDURAL at 10:40

## 2017-02-06 RX ADMIN — ROPIVACAINE HYDROCHLORIDE 16 ML/HR: 5 INJECTION, SOLUTION EPIDURAL; INFILTRATION; PERINEURAL at 10:46

## 2017-02-06 RX ADMIN — Medication 125 ML/HR: at 14:20

## 2017-02-06 RX ADMIN — DOCUSATE SODIUM 100 MG: 100 CAPSULE, LIQUID FILLED ORAL at 16:24

## 2017-02-06 RX ADMIN — Medication 2 MILLI-UNITS/MIN: at 08:51

## 2017-02-06 RX ADMIN — Medication 1000 ML/HR: at 12:50

## 2017-02-06 RX ADMIN — LIDOCAINE HYDROCHLORIDE AND EPINEPHRINE 2 ML: 15; 5 INJECTION, SOLUTION EPIDURAL at 10:43

## 2017-02-06 RX ADMIN — SODIUM CHLORIDE, POTASSIUM CHLORIDE, SODIUM LACTATE AND CALCIUM CHLORIDE 125 ML/HR: 600; 310; 30; 20 INJECTION, SOLUTION INTRAVENOUS at 08:51

## 2017-02-06 NOTE — PLAN OF CARE
Problem: Patient Care Overview (Adult)  Goal: Plan of Care Review  Outcome: Ongoing (interventions implemented as appropriate)    02/06/17 0938   Coping/Psychosocial Response Interventions   Plan Of Care Reviewed With patient;spouse;daughter   Patient Care Overview   Progress improving       Goal: Adult Individualization and Mutuality    02/06/17 0938   Individualization   Patient Specific Preferences none   Patient Specific Goals none   Patient Specific Interventions none   Mutuality/Individual Preferences   What Anxieties, Fears or Concerns Do You Have About Your Health or Care? none   What Questions Do You Have About Your Health or Care? none   What Information Would Help Us Give You More Personalized Care? none       Goal: Discharge Needs Assessment    02/06/17 0938   Discharge Needs Assessment   Concerns To Be Addressed no discharge needs identified   Equipment Needed After Discharge none   Discharge Disposition home or self-care   Self-Care   Equipment Currently Used at Home none   Living Environment   Transportation Available car         Problem: Labor (Cervical Ripen, Induct, Augment) (Adult,Obstetrics,Pediatric)  Goal: Signs and Symptoms of Listed Potential Problems Will be Absent or Manageable (Labor)  Outcome: Ongoing (interventions implemented as appropriate)    02/06/17 0938   Labor (Cervical Ripen, Induct, Augment)   Problems Assessed (Labor) pain;fetal well-being change;chorioamnionitis;intrapartum bleeding/hemorrhage;labor dystocia;malpresentation;precipitous delivery;shoulder dystocia;umbilical cord prolapse;uterine tachysystole   Problems Present (Labor) pain

## 2017-02-06 NOTE — ANESTHESIA PREPROCEDURE EVALUATION
Anesthesia Evaluation     Patient summary reviewed and Nursing notes reviewed    No history of anesthetic complications   Airway   Mallampati: II  TM distance: >3 FB  Neck ROM: full  no difficulty expected  Dental - normal exam     Pulmonary - negative pulmonary ROS   Cardiovascular - negative cardio ROS    Neuro/Psych- negative ROS  GI/Hepatic/Renal/Endo    (+)  GERD,     Musculoskeletal     (+) back pain,   Abdominal    Substance History       Comment: H/O drug abuse 4 years ago   OB/GYN    (+) Pregnant,         Other                           Anesthesia Plan    ASA 3     epidural     Anesthetic plan and risks discussed with patient.

## 2017-02-06 NOTE — H&P
[facility]  Obstetric History and Physical    Chief Complaint   Patient presents with   • Scheduled Induction       Subjective     Patient is a 41 y.o. female  currently at 39w2d, who presents with induction at term, favorable cervix and history of rapid labor.    Her prenatal care is complicated by  advanced maternal age  genetic screening was normal.  Her previous obstetric/gynecological history is noted for is non-contributory.    The following portions of the patients history were reviewed and updated as appropriate: current medications, allergies and past medical history .       Prenatal Information:  Prenatal Results         1st Trimester Ref. Range Date Time   CBC with auto diff ^ 12.5 / 36.0 / plt - 154   16    Rubella IgG ^ Immune   16    Hepatitis B SAg  Non-Reactive  Non-Reactive 16 1426   RPR  Non-Reactive  Non-Reactive 16 1426   ABO  A   17 0849   Rh  Positive   17 0849   Anibody Screen  Negative   17 0849   HIV ^ Negative   16    Varicella IgG       Urinalysis with microscopy       Urine Culture ^ negative   16    GC/Chlamydia/TV       ThinPrep/Pap ^ WNL with neg hpv   06/15/16    2nd and 3rd Trimester Ref. Range Date Time   Hemoglobin / Hematocrit  35.1 % 34.5 - 44.0 % 17 0830   Hemoglobin  11.7 g/dL 11.5 - 15.5 g/dL 17 0830   Group B Strep Culture ^ Negative   01/10/17    Glucose [place] Test 1 hour  133 mg/dL 65 - 199 mg/dL 10/20/16 1318   Glucose Tolerance Test 3 hours       Pre-eclampsia Panel  Abnormal  (A)  17 1110   Risk Screening Ref. Range Date Time   Fetal Fibronectin       Amnisure       Hepatitis C Antibody       Hemoglobin electrophoresis       Cystic Fibrosis       Hemoglobin A1C       MSAFP - 4       NIPT       AFP       Parvovirus IgG       Parvovirus IgM       POCT - glucose       [facility]       24 Hour urine - Total protein       24 Hour urine - Creatinine clearance       Urinalysis with microscopy        Urine Culture ^ negative   16    Drug Screening Ref. Range Date Time   Amphetamine Screen  Negative  Negative 16 1426   Barbiturate Screen  Negative  Negative 16 1426   Benzodiazepine Screen  Negative  Negative 16 1426   Methadone Screen  Negative  Negative 16 1426   Phencyclidine Screen  Negative  Negative 16 1426   Opiates Screen  Negative  Negative 16 1426   THC Screen  Negative  Negative 16 1426   Cocaine Screen  Negative  Negative 16 1426   Amphetamine Screen       Propoxyphene Screen  Negative  Negative 16 1426   Buprenorphine Screen       Methamphetamine Screen       Oxycodone Screen       Tryicyclic Antidepressants Screen  Negative  Negative 16 1426          Legend: ^: Historical            View all results for this pregnancy        External Prenatal Results         Pregnancy Outside Results - these were transcribed from office records.  See scanned records for details. Date Time   Hgb      Hct      ABO      Rh      Antibody Screen      Glucose Fasting GTT      Glucose Tolerance Test 1 hour      Glucose Tolerance Test 3 hour      Gonorrhea (discrete)      Chlamydia (discrete)      RPR      VDRL      Syphillis Antibody      Rubella ^ Immune  16    HBsAg      Herpes Simplex Virus PCR      Herpes Simplex VIrus Culture      HIV ^ Negative  16    Hep C RNA Quant PCR      Hep C Antibody      Urine Drug Screen      AFP      Group B Strep ^ Negative  01/10/17    GBS Susceptibility to Clindamycin      GBS Susceptibility to Eythromycin      Fetal Fibronectin      Genetic Testing, Maternal Blood             Legend: ^: Historical           Past OB History:     Obstetric History       T7      TAB0   SAB1   E0   M0   L7       # Outcome Date GA Lbr Pasquale/2nd Weight Sex Delivery Anes PTL Lv   10 Current            9 Term 12 40w0d  7 lb (3.175 kg) F Vag-Spont   Y      Name: Araceli   8 AB 2008           7 Term 07 40w0d   8 lb 4 oz (3.742 kg) M Vag-Spont   Y      Name: Olu   6 Term 07/25/04 40w0d  7 lb 8 oz (3.402 kg) M Vag-Spont   Y      Name: Vicente   5 Term 03/21/01 40w0d  7 lb 1 oz (3.204 kg) F Vag-Spont  N Y      Name: Ruth   4 Term 08/02/99 40w0d  8 lb (3.629 kg) M Vag-Spont   Y      Name: Yoel   3 Term 11/15/96 40w0d  9 lb (4.082 kg) F Vag-Spont   Y      Name: Sonal   2 Term 03/07/95 40w0d  7 lb 3 oz (3.26 kg) F Vag-Spont   Y      Name: Angela   1 SAB 1992              Obstetric Comments   New paternity this pregnancy       Past Medical History: Past Medical History   Diagnosis Date   • Anemia    • Cervical spine fracture      with ex    • Endometriosis      10 years ago   • Narcotic abuse in remission      IV drug abuse-4 years ago   • Urinary tract infection      not with this pregnancy   • Varicella       as a child      Past Surgical History Past Surgical History   Procedure Laterality Date   • Diagnostic laparoscopy     • Dilatation and evacuation     • [place] tooth extraction     • Endometrial biopsy        Family History: Family History   Problem Relation Age of Onset   • Heart disease Mother    • Lung cancer Mother    • Diabetes Paternal Uncle       Social History:  reports that she has been smoking.  She has a 2.00 pack-year smoking history. She does not have any smokeless tobacco history on file.   reports that she does not drink alcohol.   reports that she does not use illicit drugs.        General ROS: Pertinent items are noted in HPI    Objective       Vital Signs Range for the last 24 hours  Temperature: Temp:  [97.8 °F (36.6 °C)-97.9 °F (36.6 °C)] 97.8 °F (36.6 °C)   Temp Source: Temp src: Oral   BP: BP: (101-126)/(51-73) 116/73   Pulse: Heart Rate:  [75-90] 81   Respirations: Resp:  [16] 16   SPO2:     O2 Amount (l/min):     O2 Devices     Weight: Weight:  [199 lb (90.3 kg)] 199 lb (90.3 kg)     Physical Examination: General appearance - alert, well appearing, and in no distress    Presentation: vertex    Cervix: Exam by: Method: sterile exam per physician   Dilation: Dilation: 10   Effacement: Cervical Effacement: 100%   Station: Station: 2       Fetal Heart Rate Assessment   Method: Fetal HR Assessment Method: external   Beats/min: Fetal HR (Beats/Min): 150   Baseline: Fetal HR Baseline: normal range (110-160 bpm)   Varibility: Fetal HR Variability: moderate (amplitude range 6 to 25 bpm)   Accels: Fetal HR Accelerations: greater than/equal to 15 bpm, lasting at least 15 seconds   Decels: Fetal HR Decelerations: late, variable   Tracing Category:       Uterine Assessment   Method: Method: TOCO (external toco transducer)   Frequency (min): Contraction Frequency (min): 10   Ctx Count in 10 min:     Duration: Contraction Duration (sec): 60   Intensity: Contraction Intensity: mild by palpation   Intensity by IUPC:     Resting Tone: Uterine Resting Tone: soft by palpation   Resting Tone by IUPC:     [place] Units:       Assessment/Plan     Active Problems:    Pregnancy        Assessment:  1.  Intrauterine pregnancy at 39w2d weeks gestation with reactive fetal status.    2.  labor  with ROM  3.  Obstetrical history significant for is remarkable for grand multiparity and AMA  4.  GBS status: negative    Plan:  1. labor augmentation  Pitocin  2. Plan of care has been reviewed with patient and FOB  3.  Risks, benefits of treatment plan have been discussed.  4.  All questions have been answered.      Tucker Huffman MD  2/6/2017  1:12 PM

## 2017-02-06 NOTE — L&D DELIVERY NOTE
2017    Patient:Rebekah Saucedo    MR#:[id number]    Vaginal Delivery Note  41 y.o. yo female  at 39w2d    Patient Active Problem List   Diagnosis   • AMA (advanced maternal age) multigravida 35+   • Acid reflux   • Pregnancy   • History of substance abuse       Delivery     Delivery:       YOB: 2015    Time of Birth: 12:45 PM      Anesthesia:       Delivering clinician:      Forceps?   No   Vacuum? No    Shoulder dystocia present: No          Infant    Findings: male  infant     Infant observations: Weight: No birth weight on file.     Observations/Comments:         Apgars:    @ 1 minute /       @ 5 minutes         Placenta, Cord, and Fluid    Placenta delivered     at        Cord:    present.   Nuchal Cord?  no   Cord blood obtained:      Cord gases obtained:       Cord gas results: Pending         Repair    Episiotomy: No   Lacerations: Yes  Laceration Information  Laceration Repaired?   Perineal:         Periurethral:   1*   not required   Labial:         Sulcus:         Vaginal:         Cervical:            Estimated Blood Loss:    mls.   Suture used for repair: 3-0 Vicryl      Complications  none    Disposition  Mother to Mother Baby/Postpartum  in stable condition currently.  Baby to NBN  in stable condition currently.                Tcuker Huffman MD  17  1:02 PM

## 2017-02-06 NOTE — ANESTHESIA PROCEDURE NOTES
Labor Epidural    Patient location during procedure: OB  Performed By  Anesthesiologist: KODAK MO  CRNA: BJ NEWMAN  Preanesthetic Checklist  Completed: patient identified, surgical consent, pre-op evaluation, timeout performed, IV checked, risks and benefits discussed and monitors and equipment checked  Epidural Block Prep:  Pt Position:sitting  Sterile Tech:cap, gloves, mask and sterile barrier  Monitoring:blood pressure monitoring  Epidural Block Procedure:  Approach:midline  Guidance:palpation technique  Location:L4-L5  Needle Type:Tuohy  Needle Gauge:17 G  Cath Depth at skin:12 cm  Paresthesia: none  Aspiration:negative  Test Dose:negative  Post Assessment:  Dressing:occlusive dressing applied and secured with tape  Pt Tolerance:patient tolerated the procedure well with no apparent complications  Complications:no

## 2017-02-07 LAB
BASOPHILS # BLD AUTO: 0.01 10*3/MM3 (ref 0–0.2)
BASOPHILS NFR BLD AUTO: 0.1 % (ref 0–1)
DEPRECATED RDW RBC AUTO: 45.5 FL (ref 37–54)
EOSINOPHIL # BLD AUTO: 0.14 10*3/MM3 (ref 0.1–0.3)
EOSINOPHIL NFR BLD AUTO: 1.3 % (ref 0–3)
ERYTHROCYTE [DISTWIDTH] IN BLOOD BY AUTOMATED COUNT: 13.9 % (ref 11.3–14.5)
HCT VFR BLD AUTO: 31.6 % (ref 34.5–44)
HGB BLD-MCNC: 10.3 G/DL (ref 11.5–15.5)
IMM GRANULOCYTES # BLD: 0.02 10*3/MM3 (ref 0–0.03)
IMM GRANULOCYTES NFR BLD: 0.2 % (ref 0–0.6)
LYMPHOCYTES # BLD AUTO: 2.82 10*3/MM3 (ref 0.6–4.8)
LYMPHOCYTES NFR BLD AUTO: 25.9 % (ref 24–44)
MCH RBC QN AUTO: 28.6 PG (ref 27–31)
MCHC RBC AUTO-ENTMCNC: 32.6 G/DL (ref 32–36)
MCV RBC AUTO: 87.8 FL (ref 80–99)
MONOCYTES # BLD AUTO: 0.93 10*3/MM3 (ref 0–1)
MONOCYTES NFR BLD AUTO: 8.5 % (ref 0–12)
NEUTROPHILS # BLD AUTO: 6.97 10*3/MM3 (ref 1.5–8.3)
NEUTROPHILS NFR BLD AUTO: 64 % (ref 41–71)
PLATELET # BLD AUTO: 189 10*3/MM3 (ref 150–450)
PMV BLD AUTO: 10.5 FL (ref 6–12)
RBC # BLD AUTO: 3.6 10*6/MM3 (ref 3.89–5.14)
WBC NRBC COR # BLD: 10.89 10*3/MM3 (ref 3.5–10.8)

## 2017-02-07 PROCEDURE — 85025 COMPLETE CBC W/AUTO DIFF WBC: CPT | Performed by: OBSTETRICS & GYNECOLOGY

## 2017-02-07 PROCEDURE — 63710000001 DIPHENHYDRAMINE PER 50 MG: Performed by: OBSTETRICS & GYNECOLOGY

## 2017-02-07 PROCEDURE — 99231 SBSQ HOSP IP/OBS SF/LOW 25: CPT | Performed by: OBSTETRICS & GYNECOLOGY

## 2017-02-07 RX ORDER — DIPHENHYDRAMINE HCL 25 MG
25 CAPSULE ORAL EVERY 6 HOURS PRN
Status: DISCONTINUED | OUTPATIENT
Start: 2017-02-07 | End: 2017-02-08 | Stop reason: HOSPADM

## 2017-02-07 RX ADMIN — DIPHENHYDRAMINE HYDROCHLORIDE 25 MG: 25 CAPSULE ORAL at 20:37

## 2017-02-07 RX ADMIN — IBUPROFEN 600 MG: 600 TABLET ORAL at 20:35

## 2017-02-07 RX ADMIN — HYDROCODONE BITARTRATE AND ACETAMINOPHEN 2 TABLET: 10; 325 TABLET ORAL at 04:58

## 2017-02-07 RX ADMIN — HYDROCODONE BITARTRATE AND ACETAMINOPHEN 2 TABLET: 10; 325 TABLET ORAL at 15:58

## 2017-02-07 RX ADMIN — DOCUSATE SODIUM 100 MG: 100 CAPSULE, LIQUID FILLED ORAL at 20:37

## 2017-02-07 RX ADMIN — GUAIFENESIN 600 MG: 600 TABLET, EXTENDED RELEASE ORAL at 18:42

## 2017-02-07 RX ADMIN — HYDROCODONE BITARTRATE AND ACETAMINOPHEN 2 TABLET: 10; 325 TABLET ORAL at 10:37

## 2017-02-07 RX ADMIN — DIPHENHYDRAMINE HYDROCHLORIDE 25 MG: 25 CAPSULE ORAL at 04:58

## 2017-02-07 RX ADMIN — HYDROCODONE BITARTRATE AND ACETAMINOPHEN 2 TABLET: 10; 325 TABLET ORAL at 00:53

## 2017-02-07 RX ADMIN — IBUPROFEN 600 MG: 600 TABLET ORAL at 10:37

## 2017-02-07 RX ADMIN — HYDROCODONE BITARTRATE AND ACETAMINOPHEN 2 TABLET: 10; 325 TABLET ORAL at 20:35

## 2017-02-07 RX ADMIN — DIPHENHYDRAMINE HYDROCHLORIDE 25 MG: 25 CAPSULE ORAL at 12:59

## 2017-02-07 NOTE — PLAN OF CARE
Problem: Patient Care Overview (Adult)  Goal: Plan of Care Review  Outcome: Ongoing (interventions implemented as appropriate)    02/07/17 0634   Coping/Psychosocial Response Interventions   Plan Of Care Reviewed With patient;spouse   Patient Care Overview   Progress improving   Outcome Evaluation   Outcome Summary/Follow up Plan VSS, ambulating in room, pain control continues to be an issue, otherwise progressing well.       Goal: Adult Individualization and Mutuality  Outcome: Ongoing (interventions implemented as appropriate)  Goal: Discharge Needs Assessment  Outcome: Ongoing (interventions implemented as appropriate)    Problem: Postpartum, Vaginal Delivery (Adult)  Goal: Signs and Symptoms of Listed Potential Problems Will be Absent or Manageable (Postpartum, Vaginal Delivery)  Outcome: Ongoing (interventions implemented as appropriate)

## 2017-02-07 NOTE — ANESTHESIA POSTPROCEDURE EVALUATION
Patient: Rebekah Saucedo    Procedure Summary     Date Anesthesia Start Anesthesia Stop Room / Location    02/06/17 1033 1245        Procedure Diagnosis Scheduled Providers Provider    LABOR ANALGESIA No diagnosis on file.  Sima Hanson DO          Anesthesia Type: epidural  Last vitals  BP      Temp      Pulse     Resp      SpO2        Post Anesthesia Care and Evaluation    Patient location during evaluation: bedside  Patient participation: complete - patient participated  Level of consciousness: awake and alert  Pain score: 1  Pain management: adequate  Airway patency: patent  Anesthetic complications: No anesthetic complications    Cardiovascular status: acceptable  Respiratory status: acceptable  Hydration status: acceptable  Post Neuraxial Block status: Motor and sensory function returned to baseline and No signs or symptoms of PDPH

## 2017-02-07 NOTE — PROGRESS NOTES
"2/7/2017  PPD #1    Subjective   Rebekah feels okay except for generalized itching.  Patient describes her lochia less than menses.  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 PPD# 1 after vaginal delivery    Plan  1. Routine postpartum care.      This note has been electronically signed.    Tucker Huffman MD  February 7, 2017  "

## 2017-02-08 VITALS
HEART RATE: 75 BPM | SYSTOLIC BLOOD PRESSURE: 111 MMHG | WEIGHT: 199 LBS | HEIGHT: 65 IN | RESPIRATION RATE: 16 BRPM | BODY MASS INDEX: 33.15 KG/M2 | DIASTOLIC BLOOD PRESSURE: 65 MMHG | TEMPERATURE: 98.5 F

## 2017-02-08 LAB — HCV AB SER DONR QL: REACTIVE

## 2017-02-08 PROCEDURE — 99238 HOSP IP/OBS DSCHRG MGMT 30/<: CPT | Performed by: OBSTETRICS & GYNECOLOGY

## 2017-02-08 PROCEDURE — 87522 HEPATITIS C REVRS TRNSCRPJ: CPT | Performed by: OBSTETRICS & GYNECOLOGY

## 2017-02-08 PROCEDURE — 86803 HEPATITIS C AB TEST: CPT | Performed by: OBSTETRICS & GYNECOLOGY

## 2017-02-08 RX ORDER — GUAIFENESIN 600 MG/1
600 TABLET, EXTENDED RELEASE ORAL EVERY 12 HOURS SCHEDULED
Qty: 20 TABLET | Refills: 0 | Status: SHIPPED | OUTPATIENT
Start: 2017-02-08 | End: 2017-03-16

## 2017-02-08 RX ORDER — ACETAMINOPHEN 325 MG/1
650 TABLET ORAL EVERY 4 HOURS PRN
Refills: 0
Start: 2017-02-08 | End: 2017-03-16

## 2017-02-08 RX ORDER — IBUPROFEN 600 MG/1
600 TABLET ORAL EVERY 8 HOURS PRN
Qty: 30 TABLET | Refills: 0 | Status: SHIPPED | OUTPATIENT
Start: 2017-02-08 | End: 2017-07-25

## 2017-02-08 RX ADMIN — IBUPROFEN 600 MG: 600 TABLET ORAL at 04:25

## 2017-02-08 RX ADMIN — HYDROCODONE BITARTRATE AND ACETAMINOPHEN 2 TABLET: 10; 325 TABLET ORAL at 12:11

## 2017-02-08 RX ADMIN — HYDROCODONE BITARTRATE AND ACETAMINOPHEN 2 TABLET: 10; 325 TABLET ORAL at 09:12

## 2017-02-08 RX ADMIN — OXYCODONE AND ACETAMINOPHEN 1 TABLET: 5; 325 TABLET ORAL at 04:25

## 2017-02-08 NOTE — PROGRESS NOTES
Neonatologist requested HCV status - not in Epic so will draw today if she hasnt been discharged yet

## 2017-02-08 NOTE — DISCHARGE SUMMARY
Discharge Summary    Date of Admission: 2017  Date of Discharge:  2017      Patient: Rebekah Saucedo      MR#:[id number]    Delivery Provider: Tucker Huffman     Discharge Surgeon/OB: Tucker Huffman MD    Presenting Problem/History of Present Illness  Pregnancy [Z33.1]  Term pregnancy [Z34.80]     Patient Active Problem List   Diagnosis   • AMA (advanced maternal age) multigravida 35+   • Acid reflux   • Pregnancy   • History of substance abuse   • Term pregnancy   • Postpartum care following vaginal delivery         Discharge Diagnosis: Vaginal delivery at 39w2d    Procedures:  Vaginal, Spontaneous Delivery     2017    12:45 PM        Discharge Date: 2017;     Hospital Course  Patient is a 41 y.o. female  at 39w2d status post vaginal delivery without complication. Postpartum the patient did well. She remained afebrile, with vital signs stable. She was ready for discharge on postpartum day 2.     Infant:   male  fetus 8 lb 5.2 oz (3.775 kg)  with Apgar scores of 9  , 9   at five minutes. Circumcision was done on 17.    Condition on Discharge:  Stable; she had sinus congestion and OTC medications were reviewed.    Vital Signs  Temp:  [98 °F (36.7 °C)-98.5 °F (36.9 °C)] 98.5 °F (36.9 °C)  Heart Rate:  [70-85] 75  Resp:  [16] 16  BP: ()/(58-69) 111/65    Lab Results   Component Value Date    WBC 10.89 (H) 2017    HGB 10.3 (L) 2017    HCT 31.6 (L) 2017    MCV 87.8 2017     2017       Discharge Disposition  Home or Self Care    Discharge Medications   Rebekah Saucedo   Home Medication Instructions ANGELINA:[id number]    Printed on:17 0828   Medication Information                      acetaminophen (TYLENOL) 325 MG tablet  Take 2 tablets by mouth Every 4 (Four) Hours As Needed for mild pain (1-3) or headaches.             benzocaine (AMERICAINE) 20 % rectal ointment  Insert 1 application into the rectum As Needed for hemorrhoids.             ferrous  sulfate 325 (65 FE) MG tablet  Take 1 tablet by mouth Daily With Breakfast.             guaiFENesin (MUCINEX) 600 MG 12 hr tablet  Take 1 tablet by mouth Every 12 (Twelve) Hours.             ibuprofen (ADVIL,MOTRIN) 600 MG tablet  Take 1 tablet by mouth Every 8 (Eight) Hours As Needed for mild pain (1-3).             lansoprazole (PREVACID) 30 MG capsule  Take 30 mg by mouth Daily.             metFORMIN ER (GLUCOPHAGE XR) 750 MG 24 hr tablet  Take 1 tablet by mouth Daily With Breakfast.             psyllium (METAMUCIL) 58.6 % packet  Take 1 packet by mouth Daily.                 Discharge Diet: regular    Activity at Discharge:   Activity Instructions     Pelvic Rest                     Follow-up Appointments  No future appointments.  Referrals and Follow-ups to Schedule     Follow-Up    As directed    Follow Up Details:  6 weeks with me [phone]                 Tucker Huffman MD  02/08/17  8:28 AM  Csd

## 2017-02-09 LAB
HCV RNA SERPL NAA+PROBE-ACNC: NORMAL IU/ML
HCV RNA SERPL NAA+PROBE-LOG IU: 6.2 LOG10 IU/ML
TEST INFORMATION: NORMAL

## 2017-02-10 ENCOUNTER — TELEPHONE (OUTPATIENT)
Dept: OBSTETRICS AND GYNECOLOGY | Facility: CLINIC | Age: 42
End: 2017-02-10

## 2017-02-13 ENCOUNTER — TELEPHONE (OUTPATIENT)
Dept: OBSTETRICS AND GYNECOLOGY | Facility: CLINIC | Age: 42
End: 2017-02-13

## 2017-02-13 NOTE — TELEPHONE ENCOUNTER
----- Message from Lesia Matt sent at 2/10/2017  2:31 PM EST -----  Contact: [phone]  Please call Hellen [email or web address] regarding Rebekah Saucedo, needs to know if Hep case is acute or chronic and if the patient is pregnant and any signs and symptoms and if she has seen gastro?  She is getting ready to leave today, wants you to call her on Monday early morning between 830-9.  (tell them to get her when they answer the phone)    [phone] spoke with Hellen regarding Rebekah Saucedo.

## 2017-03-16 ENCOUNTER — POSTPARTUM VISIT (OUTPATIENT)
Dept: OBSTETRICS AND GYNECOLOGY | Facility: CLINIC | Age: 42
End: 2017-03-16

## 2017-03-16 VITALS — WEIGHT: 178 LBS | DIASTOLIC BLOOD PRESSURE: 70 MMHG | BODY MASS INDEX: 29.62 KG/M2 | SYSTOLIC BLOOD PRESSURE: 100 MMHG

## 2017-03-16 DIAGNOSIS — Z30.09 CONSULTATION FOR STERILIZATION: Primary | ICD-10-CM

## 2017-03-16 NOTE — PROGRESS NOTES
Subjective   Chief Complaint   Patient presents with   • Postpartum Care     Rebekah Saucedo is a 42 y.o. year old  presenting to be seen for her postpartum visit.  She had a vaginal delivery.  Her pregnancy was significant for AMA (genetic screening was normal).    Since delivery she has not been sexually active.  She does not have concerns about post-partum blues/depression.  She is bottle feeding.  For ongoing contraception, she is considering IUD - Paraguard for birth control.    No Additional Complaints Reported    The following portions of the patient's history were reviewed and updated as appropriate:current medications, allergies and past surgical history    Review of Systems normal bladder ; but constipated 4 BM in 6 weeks on Metamucil prn     Objective   Visit Vitals   • /70   • Wt 178 lb (80.7 kg)   • LMP 2016 (Exact Date)   • BMI 29.62 kg/m2       General:  well developed; well nourished  no acute distress   Breasts:  Not performed.   Abdomen: soft, non-tender; no masses  no umbilical or inginual hernias are present  no hepato-splenomegaly   Pelvis: Clinical staff was present for exam  External genitalia:  normal appearance of the external genitalia including Bartholin's and [place]'s glands.  :  urethral meatus normal; urethral hypermobility is absent.  Uterus:  normal size, shape and consistency. anteverted;  Adnexa:  normal bimanual exam of the adnexa.  Rectal:  digital rectal exam not performed; anus visually normal appearing.          Assessment   1. Normal exam   2. Left sided pain occasionally with non tender today  3. constipation     Plan   1. Metamucil 3 x weekly - effective when she uses it   2. Paraguard     Medications Rx this encounter:  No orders of the defined types were placed in this encounter.         This note was electronically signed.    Tucker Huffman MD  2017

## 2017-06-19 RX ORDER — SODIUM CHLORIDE 0.9 % (FLUSH) 0.9 %
1-10 SYRINGE (ML) INJECTION AS NEEDED
Status: CANCELLED | OUTPATIENT
Start: 2017-06-19

## 2017-06-24 ENCOUNTER — RESULTS ENCOUNTER (OUTPATIENT)
Dept: OBSTETRICS AND GYNECOLOGY | Facility: CLINIC | Age: 42
End: 2017-06-24

## 2017-06-24 DIAGNOSIS — Z30.09 CONSULTATION FOR STERILIZATION: ICD-10-CM

## 2017-06-26 ENCOUNTER — APPOINTMENT (OUTPATIENT)
Dept: GENERAL RADIOLOGY | Facility: HOSPITAL | Age: 42
End: 2017-06-26

## 2017-06-26 ENCOUNTER — HOSPITAL ENCOUNTER (EMERGENCY)
Facility: HOSPITAL | Age: 42
Discharge: HOME | End: 2017-06-26
Attending: EMERGENCY MEDICINE | Admitting: EMERGENCY MEDICINE

## 2017-06-26 VITALS
DIASTOLIC BLOOD PRESSURE: 67 MMHG | SYSTOLIC BLOOD PRESSURE: 114 MMHG | HEIGHT: 65 IN | WEIGHT: 165 LBS | OXYGEN SATURATION: 100 % | BODY MASS INDEX: 27.49 KG/M2 | TEMPERATURE: 98.3 F | RESPIRATION RATE: 20 BRPM | HEART RATE: 76 BPM

## 2017-06-26 DIAGNOSIS — M25.532 LEFT WRIST PAIN: Primary | ICD-10-CM

## 2017-06-26 PROCEDURE — 99283 EMERGENCY DEPT VISIT LOW MDM: CPT

## 2017-06-26 RX ORDER — HYDROCODONE BITARTRATE AND ACETAMINOPHEN 10; 325 MG/1; MG/1
1 TABLET ORAL ONCE
Status: COMPLETED | OUTPATIENT
Start: 2017-06-26 | End: 2017-06-26

## 2017-06-26 RX ORDER — ONDANSETRON 4 MG/1
4 TABLET, ORALLY DISINTEGRATING ORAL ONCE
Status: COMPLETED | OUTPATIENT
Start: 2017-06-26 | End: 2017-06-26

## 2017-06-26 RX ORDER — HYDROCODONE BITARTRATE AND ACETAMINOPHEN 5; 325 MG/1; MG/1
1-2 TABLET ORAL EVERY 6 HOURS PRN
Qty: 15 TABLET | Refills: 0 | Status: SHIPPED | OUTPATIENT
Start: 2017-06-26 | End: 2017-07-25

## 2017-06-26 RX ADMIN — HYDROCODONE BITARTRATE AND ACETAMINOPHEN 1 TABLET: 10; 325 TABLET ORAL at 20:45

## 2017-06-26 RX ADMIN — ONDANSETRON 4 MG: 4 TABLET, ORALLY DISINTEGRATING ORAL at 20:45

## 2017-06-27 NOTE — ED PROVIDER NOTES
Subjective   HPI Comments: Left wrist pain for 4 months. Started after having IV removed after having a baby. Has seen her pcp who sent her to ortho.h as  Brace and goes to PT. Co worsening pain. Worse with movement. Awaiting MR after completing PT    No fever.    Patient is a 42 y.o. female presenting with wrist injury.   Wrist Injury   Pain details:     Timing:  Constant    Progression:  Worsening  Foreign body present:  No foreign bodies  Relieved by:  Immobilization  Worsened by:  Movement  Associated symptoms: decreased range of motion and tingling    Associated symptoms: no fever and no swelling    Risk factors: no concern for non-accidental trauma        Review of Systems   Constitutional: Negative for fever.   All other systems reviewed and are negative.      Past Medical History:   Diagnosis Date   • Anemia    • Cervical spine fracture     with ex    • Endometriosis     10 years ago   • Narcotic abuse in remission     IV drug abuse-4 years ago   • Urinary tract infection     not with this pregnancy   • Varicella      as a child       No Known Allergies    Past Surgical History:   Procedure Laterality Date   • DIAGNOSTIC LAPAROSCOPY     • DILATATION AND EVACUATION     • ENDOMETRIAL BIOPSY     • WISDOM TOOTH EXTRACTION         Family History   Problem Relation Age of Onset   • Heart disease Mother    • Lung cancer Mother    • Diabetes Paternal Uncle        Social History     Social History   • Marital status:      Spouse name: N/A   • Number of children: N/A   • Years of education: N/A     Social History Main Topics   • Smoking status: Current Some Day Smoker     Packs/day: 1.00     Years: 2.00   • Smokeless tobacco: None      Comment: trying to quit   • Alcohol use No   • Drug use: No      Comment: clean for 4 years   • Sexual activity: Yes     Partners: Male     Other Topics Concern   • None     Social History Narrative           Objective   Physical Exam   Constitutional: She is oriented to  person, place, and time. She appears well-developed and well-nourished.   HENT:   Head: Normocephalic and atraumatic.   Right Ear: External ear normal.   Left Ear: External ear normal.   Nose: Nose normal.   Mouth/Throat: Oropharynx is clear and moist.   Eyes: Conjunctivae and EOM are normal. Pupils are equal, round, and reactive to light.   Neck: Normal range of motion. Neck supple.   Cardiovascular: Normal rate, regular rhythm, normal heart sounds and intact distal pulses.    Pulmonary/Chest: Effort normal and breath sounds normal.   Abdominal: Soft. Bowel sounds are normal.   Musculoskeletal: Normal range of motion.        Left wrist: She exhibits tenderness. She exhibits normal range of motion, no swelling and no deformity.   Strong pulses. Not cw pad or dvt.   Neurological: She is alert and oriented to person, place, and time.   Skin: Skin is warm and dry.   Psychiatric: She has a normal mood and affect. Her behavior is normal. Judgment and thought content normal.       Procedures         ED Course  ED Course   Comment By Time   Being tx for tendonitis by ortho. Seems reasonable. Also need to considering nerve pain rt iv and medications during hospitalization. Will give hand surgery fu and pain meds. All thankful and agreeable. KAITLIN Oleary 06/26 2038                  [facility]    Final diagnoses:   Left wrist pain            KAITLIN Oleary  06/26/17 2047

## 2017-07-25 ENCOUNTER — OFFICE VISIT (OUTPATIENT)
Dept: OBSTETRICS AND GYNECOLOGY | Facility: CLINIC | Age: 42
End: 2017-07-25

## 2017-07-25 VITALS
BODY MASS INDEX: 28.46 KG/M2 | SYSTOLIC BLOOD PRESSURE: 112 MMHG | RESPIRATION RATE: 16 BRPM | WEIGHT: 171 LBS | DIASTOLIC BLOOD PRESSURE: 70 MMHG

## 2017-07-25 DIAGNOSIS — Z01.818 PRE-OP EXAM: Primary | ICD-10-CM

## 2017-07-25 DIAGNOSIS — Z30.09 STERILIZATION CONSULT: ICD-10-CM

## 2017-07-25 PROBLEM — Z34.90 TERM PREGNANCY: Status: RESOLVED | Noted: 2017-02-06 | Resolved: 2017-07-25

## 2017-07-25 PROCEDURE — S0260 H&P FOR SURGERY: HCPCS | Performed by: OBSTETRICS & GYNECOLOGY

## 2017-07-27 NOTE — PROGRESS NOTES
Subjective   Rebekah Saucedo is a 42 y.o. year old  who is scheduled  for surgery due to multiparity and a desire for permanent surgical sterilization.  Options have been discussed with the patient including IUDs.  She is not a candidate for birth control pills as she is a half a pack per day smoker.  She understands risk complications regarding permanent surgical sterilization via salpingectomy banding or clipping.  Also discussed Essure procedure which she declines.    Past Medical History:   Diagnosis Date   • Anemia    • Cervical spine fracture     with ex    • Endometriosis     10 years ago   • Narcotic abuse in remission     IV drug abuse-4 years ago   • Urinary tract infection     not with this pregnancy   • Varicella      as a child     Past Surgical History:   Procedure Laterality Date   • DIAGNOSTIC LAPAROSCOPY     • DILATATION AND EVACUATION     • ENDOMETRIAL BIOPSY     • WISDOM TOOTH EXTRACTION       Social History     Social History   • Marital status:      Spouse name: N/A   • Number of children: N/A   • Years of education: N/A     Social History Main Topics   • Smoking status: Current Some Day Smoker     Packs/day: 1.00     Years: 2.00   • Smokeless tobacco: None      Comment: trying to quit   • Alcohol use No   • Drug use: No      Comment: clean for 4 years   • Sexual activity: Yes     Partners: Male     Other Topics Concern   • None     Social History Narrative       Current Outpatient Prescriptions:   •  ferrous sulfate 325 (65 FE) MG tablet, Take 1 tablet by mouth Daily With Breakfast., Disp: 30 tablet, Rfl: 3    No Known Allergies  Smoking status: Current Some Day Smoker                                                    Packs/day: 1.00      Years: 2.00         Smokeless status: Not on file                     Comment: trying to quit    Review of Systems normal bladder and bowels no fever no vaginal discharge      Objective   /70  Resp 16  Wt 171 lb (77.6 kg)  LMP  07/10/2017 Comment: 28 cycle days  Breastfeeding? Yes  BMI 28.46 kg/m2  General: well developed; well nourished  no acute distress  appears stated age   Heart: regular rate and rhythm, S1, S2 normal, no murmur, click, rub or gallop   Lungs: breathing is unlabored  clear to auscultation bilaterally   Abdomen: soft, non-tender; no masses  no umbilical or inginual hernias are present  no hepato-splenomegaly   Pelvis:: Not performed.        Assessment   1. Grand multiparity  2. Desires permanent surgical sterilization I discussed salpingectomy being possibly more effective and risk reducing for tubal/adnexal cancer.  She'll consider this as opposed to banding or clipping.  She understands the non-reversibility but she's had a children and is 42 years old.     Plan   1. Bilateral tubal ligation via banding clipping or salpingectomy Friday, July 28 at [facility] [place]  2. She remained nothing by mouth.  Told not to clip or shave any hair.  Permits have been signed.      Tucker Huffman M.D.  7/27/2017

## 2017-07-28 ENCOUNTER — OUTSIDE FACILITY SERVICE (OUTPATIENT)
Dept: OBSTETRICS AND GYNECOLOGY | Facility: CLINIC | Age: 42
End: 2017-07-28

## 2017-07-28 ENCOUNTER — LAB REQUISITION (OUTPATIENT)
Dept: LAB | Facility: HOSPITAL | Age: 42
End: 2017-07-28

## 2017-07-28 DIAGNOSIS — Z30.2 ENCOUNTER FOR STERILIZATION: ICD-10-CM

## 2017-07-28 PROCEDURE — 58661 LAPAROSCOPY REMOVE ADNEXA: CPT | Performed by: OBSTETRICS & GYNECOLOGY

## 2017-07-28 PROCEDURE — 88305 TISSUE EXAM BY PATHOLOGIST: CPT | Performed by: OBSTETRICS & GYNECOLOGY

## 2017-07-29 LAB
CYTO UR: NORMAL
LAB AP CASE REPORT: NORMAL
LAB AP CLINICAL INFORMATION: NORMAL
Lab: NORMAL
PATH REPORT.FINAL DX SPEC: NORMAL
PATH REPORT.GROSS SPEC: NORMAL

## 2020-01-12 ENCOUNTER — APPOINTMENT (OUTPATIENT)
Dept: GENERAL RADIOLOGY | Facility: HOSPITAL | Age: 45
End: 2020-01-12

## 2020-01-12 ENCOUNTER — HOSPITAL ENCOUNTER (EMERGENCY)
Facility: HOSPITAL | Age: 45
Discharge: HOME | End: 2020-01-12
Attending: EMERGENCY MEDICINE | Admitting: EMERGENCY MEDICINE

## 2020-01-12 ENCOUNTER — APPOINTMENT (OUTPATIENT)
Dept: CT IMAGING | Facility: HOSPITAL | Age: 45
End: 2020-01-12

## 2020-01-12 VITALS
OXYGEN SATURATION: 96 % | BODY MASS INDEX: 28.32 KG/M2 | HEIGHT: 65 IN | WEIGHT: 170 LBS | TEMPERATURE: 98.4 F | DIASTOLIC BLOOD PRESSURE: 77 MMHG | HEART RATE: 72 BPM | SYSTOLIC BLOOD PRESSURE: 111 MMHG | RESPIRATION RATE: 16 BRPM

## 2020-01-12 DIAGNOSIS — R07.9 CHEST PAIN, UNSPECIFIED TYPE: Primary | ICD-10-CM

## 2020-01-12 LAB
ALBUMIN SERPL-MCNC: 4.7 G/DL (ref 3.5–5.2)
ALBUMIN/GLOB SERPL: 1.7 G/DL
ALP SERPL-CCNC: 40 U/L (ref 39–117)
ALT SERPL W P-5'-P-CCNC: 9 U/L (ref 1–33)
ANION GAP SERPL CALCULATED.3IONS-SCNC: 12 MMOL/L (ref 5–15)
AST SERPL-CCNC: 18 U/L (ref 1–32)
B-HCG UR QL: NEGATIVE
BASOPHILS # BLD AUTO: 0.02 10*3/MM3 (ref 0–0.2)
BASOPHILS NFR BLD AUTO: 0.3 % (ref 0–1.5)
BILIRUB SERPL-MCNC: 0.3 MG/DL (ref 0.2–1.2)
BUN BLD-MCNC: 11 MG/DL (ref 6–20)
BUN/CREAT SERPL: 13.8 (ref 7–25)
CALCIUM SPEC-SCNC: 9.5 MG/DL (ref 8.6–10.5)
CHLORIDE SERPL-SCNC: 99 MMOL/L (ref 98–107)
CO2 SERPL-SCNC: 25 MMOL/L (ref 22–29)
CREAT BLD-MCNC: 0.8 MG/DL (ref 0.57–1)
DEPRECATED RDW RBC AUTO: 41.8 FL (ref 37–54)
EOSINOPHIL # BLD AUTO: 0.23 10*3/MM3 (ref 0–0.4)
EOSINOPHIL NFR BLD AUTO: 3.2 % (ref 0.3–6.2)
ERYTHROCYTE [DISTWIDTH] IN BLOOD BY AUTOMATED COUNT: 12.8 % (ref 12.3–15.4)
GFR SERPL CREATININE-BSD FRML MDRD: 79 ML/MIN/1.73
GLOBULIN UR ELPH-MCNC: 2.7 GM/DL
GLUCOSE BLD-MCNC: 76 MG/DL (ref 65–99)
HCT VFR BLD AUTO: 40 % (ref 34–46.6)
HGB BLD-MCNC: 13.6 G/DL (ref 12–15.9)
HOLD SPECIMEN: NORMAL
HOLD SPECIMEN: NORMAL
IMM GRANULOCYTES # BLD AUTO: 0.02 10*3/MM3 (ref 0–0.05)
IMM GRANULOCYTES NFR BLD AUTO: 0.3 % (ref 0–0.5)
INTERNAL NEGATIVE CONTROL: NEGATIVE
INTERNAL POSITIVE CONTROL: POSITIVE
LIPASE SERPL-CCNC: 15 U/L (ref 13–60)
LYMPHOCYTES # BLD AUTO: 3.28 10*3/MM3 (ref 0.7–3.1)
LYMPHOCYTES NFR BLD AUTO: 46.3 % (ref 19.6–45.3)
Lab: NORMAL
MCH RBC QN AUTO: 30.2 PG (ref 26.6–33)
MCHC RBC AUTO-ENTMCNC: 34 G/DL (ref 31.5–35.7)
MCV RBC AUTO: 88.9 FL (ref 79–97)
MONOCYTES # BLD AUTO: 0.56 10*3/MM3 (ref 0.1–0.9)
MONOCYTES NFR BLD AUTO: 7.9 % (ref 5–12)
NEUTROPHILS # BLD AUTO: 2.97 10*3/MM3 (ref 1.7–7)
NEUTROPHILS NFR BLD AUTO: 42 % (ref 42.7–76)
NRBC BLD AUTO-RTO: 0.3 /100 WBC (ref 0–0.2)
NT-PROBNP SERPL-MCNC: 40.6 PG/ML (ref 5–450)
PLATELET # BLD AUTO: 151 10*3/MM3 (ref 140–450)
PMV BLD AUTO: 9.9 FL (ref 6–12)
POTASSIUM BLD-SCNC: 3.9 MMOL/L (ref 3.5–5.2)
PROT SERPL-MCNC: 7.4 G/DL (ref 6–8.5)
RBC # BLD AUTO: 4.5 10*6/MM3 (ref 3.77–5.28)
SODIUM BLD-SCNC: 136 MMOL/L (ref 136–145)
TROPONIN T SERPL-MCNC: <0.01 NG/ML (ref 0–0.03)
TROPONIN T SERPL-MCNC: <0.01 NG/ML (ref 0–0.03)
WBC NRBC COR # BLD: 7.08 10*3/MM3 (ref 3.4–10.8)
WHOLE BLOOD HOLD SPECIMEN: NORMAL
WHOLE BLOOD HOLD SPECIMEN: NORMAL

## 2020-01-12 PROCEDURE — 93005 ELECTROCARDIOGRAM TRACING: CPT | Performed by: EMERGENCY MEDICINE

## 2020-01-12 PROCEDURE — 81025 URINE PREGNANCY TEST: CPT | Performed by: EMERGENCY MEDICINE

## 2020-01-12 PROCEDURE — 80053 COMPREHEN METABOLIC PANEL: CPT | Performed by: EMERGENCY MEDICINE

## 2020-01-12 PROCEDURE — 71275 CT ANGIOGRAPHY CHEST: CPT

## 2020-01-12 PROCEDURE — 83690 ASSAY OF LIPASE: CPT | Performed by: EMERGENCY MEDICINE

## 2020-01-12 PROCEDURE — 71045 X-RAY EXAM CHEST 1 VIEW: CPT

## 2020-01-12 PROCEDURE — 99285 EMERGENCY DEPT VISIT HI MDM: CPT

## 2020-01-12 PROCEDURE — 96374 THER/PROPH/DIAG INJ IV PUSH: CPT

## 2020-01-12 PROCEDURE — 25010000002 KETOROLAC TROMETHAMINE PER 15 MG: Performed by: EMERGENCY MEDICINE

## 2020-01-12 PROCEDURE — 83880 ASSAY OF NATRIURETIC PEPTIDE: CPT | Performed by: EMERGENCY MEDICINE

## 2020-01-12 PROCEDURE — 85025 COMPLETE CBC W/AUTO DIFF WBC: CPT | Performed by: EMERGENCY MEDICINE

## 2020-01-12 PROCEDURE — 0 IOPAMIDOL PER 1 ML: Performed by: EMERGENCY MEDICINE

## 2020-01-12 PROCEDURE — 84484 ASSAY OF TROPONIN QUANT: CPT | Performed by: EMERGENCY MEDICINE

## 2020-01-12 RX ORDER — OMEPRAZOLE 40 MG/1
40 CAPSULE, DELAYED RELEASE ORAL DAILY
COMMUNITY

## 2020-01-12 RX ORDER — ASPIRIN 81 MG/1
324 TABLET, CHEWABLE ORAL ONCE
Status: COMPLETED | OUTPATIENT
Start: 2020-01-12 | End: 2020-01-12

## 2020-01-12 RX ORDER — KETOROLAC TROMETHAMINE 15 MG/ML
15 INJECTION, SOLUTION INTRAMUSCULAR; INTRAVENOUS ONCE
Status: COMPLETED | OUTPATIENT
Start: 2020-01-12 | End: 2020-01-12

## 2020-01-12 RX ORDER — NITROGLYCERIN 0.4 MG/1
0.4 TABLET SUBLINGUAL
Status: DISCONTINUED | OUTPATIENT
Start: 2020-01-12 | End: 2020-01-13 | Stop reason: HOSPADM

## 2020-01-12 RX ORDER — NAPROXEN 500 MG/1
500 TABLET ORAL 2 TIMES DAILY PRN
Qty: 20 TABLET | Refills: 0 | Status: SHIPPED | OUTPATIENT
Start: 2020-01-12

## 2020-01-12 RX ORDER — SODIUM CHLORIDE 0.9 % (FLUSH) 0.9 %
10 SYRINGE (ML) INJECTION AS NEEDED
Status: DISCONTINUED | OUTPATIENT
Start: 2020-01-12 | End: 2020-01-13 | Stop reason: HOSPADM

## 2020-01-12 RX ADMIN — NITROGLYCERIN 0.4 MG: 0.4 TABLET SUBLINGUAL at 21:30

## 2020-01-12 RX ADMIN — ASPIRIN 81 MG 324 MG: 81 TABLET ORAL at 19:05

## 2020-01-12 RX ADMIN — IOPAMIDOL 80 ML: 755 INJECTION, SOLUTION INTRAVENOUS at 20:08

## 2020-01-12 RX ADMIN — KETOROLAC TROMETHAMINE 15 MG: 15 INJECTION, SOLUTION INTRAMUSCULAR; INTRAVENOUS at 20:53

## 2020-01-12 NOTE — ED PROVIDER NOTES
"Subjective     44-year-old female presents for evaluation of chest pain and shortness of breath.  Of note, the patient has cardiac risk factors of smoking and positive family history.  She states that throughout the day today she has been experiencing intermittent episodes of central chest pain that radiate to the back.  She describes the pain as a \"tightness.\"  She endorses accompanying nausea and shortness of breath but no vomiting.  She states that she had an episode of diaphoresis as well.  No recent cough or fever.  No sick contacts.  No association with eating.  She is unsure as to what may have triggered her current symptoms.  She took 1 of her husbands nitroglycerin which provided her with mild relief.      History provided by:  Patient  Chest Pain   Pain location:  Substernal area  Pain quality: pressure, radiating and tightness    Pain radiates to:  Neck, L shoulder, R shoulder and mid back  Pain severity:  Moderate  Onset quality:  Sudden  Timing:  Intermittent  Progression:  Unchanged  Chronicity:  New  Relieved by:  Nitroglycerin (Some improvement.)  Associated symptoms: back pain, nausea and shortness of breath    Associated symptoms: no cough, no diaphoresis (Resolved.) and no fever    Risk factors: smoking    Risk factors: no diabetes mellitus, no high cholesterol, no hypertension, not male and not obese        Review of Systems   Constitutional: Negative for chills, diaphoresis (Resolved.) and fever.   Respiratory: Positive for shortness of breath. Negative for cough.    Cardiovascular: Positive for chest pain.   Gastrointestinal: Positive for nausea.   Musculoskeletal: Positive for back pain and neck pain.   All other systems reviewed and are negative.      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 drug abuse-4 years ago   • Urinary tract infection  "    not with this pregnancy   • Varicella      as a child       No Known Allergies    Past Surgical History:   Procedure Laterality Date   • DIAGNOSTIC LAPAROSCOPY     • DILATATION AND EVACUATION     • ENDOMETRIAL BIOPSY     • WISDOM TOOTH EXTRACTION         Family History   Problem Relation Age of Onset   • Heart disease Mother    • Lung cancer Mother    • Diabetes Paternal Uncle        Social History     Socioeconomic History   • Marital status:      Spouse name: Not on file   • Number of children: Not on file   • Years of education: Not on file   • Highest education level: Not on file   Tobacco Use   • Smoking status: Current Some Day Smoker     Packs/day: 1.00     Years: 2.00     Pack years: 2.00   • Tobacco comment: trying to quit   Substance and Sexual Activity   • Alcohol use: No   • Drug use: No     Comment: clean for 4 years   • Sexual activity: Yes     Partners: Male         Objective   Physical Exam   Constitutional: She is oriented to person, place, and time. She appears well-developed and well-nourished. No distress.   Well-appearing female in no acute distress   HENT:   Head: Normocephalic and atraumatic.   Mouth/Throat: Oropharynx is clear and moist.   Eyes: Pupils are equal, round, and reactive to light. EOM are normal.   Neck: Normal range of motion. Neck supple. No JVD present.   Cardiovascular: Normal rate, regular rhythm, normal heart sounds, intact distal pulses and normal pulses. Exam reveals no gallop and no friction rub.   No murmur heard.  Pulmonary/Chest: Effort normal and breath sounds normal. No respiratory distress. She has no wheezes. She has no rales.   Abdominal: Soft. Bowel sounds are normal. She exhibits no distension and no mass. There is no tenderness. There is no rebound and no guarding.   Musculoskeletal: Normal range of motion.        Right lower leg: She exhibits no edema.        Left lower leg: She exhibits no edema.   Neurological: She is alert and oriented to person,  place, and time.   Skin: Skin is warm and dry. No rash noted. She is not diaphoretic. No erythema.   Psychiatric: She has a normal mood and affect. Judgment and thought content normal.   Nursing note and vitals reviewed.      Procedures         ED Course  ED Course as of Jan 12 2240   Sun Jan 12, 2020 1933 44-year-old female presents for evaluation of chest pain and shortness of breath.  She states that her symptoms are intermittent and that she is also experiencing pain radiating to her upper back.  On arrival to the ED, patient very well-appearing with unremarkable exam and reassuring vital signs.  Low risk Well's and PERC negative.  Initial EKG revealed normal sinus rhythm with a heart rate of 93 and no ST segments suggestive of or concerning for ischemia.  Labs unrevealing.  HEART score of 1.     [DD]   2109 Chest CTA unremarkable.    [DD]   2149 Upon reevaluation, patient much improved.Repeat troponin/EKG negative/unchanged.  Doubt ACS, PE, dissection, or emergent cardiothoracic process at this time based on exam, history, clinical presentation, gestalt, objective findings in the ED, and risk stratification.  HEART score of 1.  The patient will follow up with the chest pain clinic within the next 72 hours for further outpatient work-up and evaluation.  Agreeable with plan and given appropriate strict return precautions.      [DD]      ED Course User Index  [DD] Harsh Case MD     No results found for this or any previous visit (from the past 24 hour(s)).  Note: In addition to lab results from this visit, the labs listed above may include labs taken at another facility or during a different encounter within the last 24 hours. Please correlate lab times with ED admission and discharge times for further clarification of the services performed during this visit.    XR Chest 1 View    (Results Pending)   CT Angiogram Chest    (Results Pending)     Vitals:    01/12/20 1844   BP: 126/70   BP Location: Left  "arm   Patient Position: Sitting   Pulse: 89   Resp: 16   Temp: 98.4 °F (36.9 °C)   TempSrc: Oral   SpO2: 100%   Weight: 77.1 kg (170 lb)   Height: 165.1 cm (65\")     Medications   sodium chloride 0.9 % flush 10 mL (has no administration in time range)   aspirin chewable tablet 324 mg (has no administration in time range)     ECG/EMG Results (last 24 hours)     ** No results found for the last 24 hours. **        ECG 12 Lead                     Recent Results (from the past 24 hour(s))   Troponin    Collection Time: 01/12/20  6:55 PM   Result Value Ref Range    Troponin T <0.010 0.000 - 0.030 ng/mL   Comprehensive Metabolic Panel    Collection Time: 01/12/20  6:55 PM   Result Value Ref Range    Glucose 76 65 - 99 mg/dL    BUN 11 6 - 20 mg/dL    Creatinine 0.80 0.57 - 1.00 mg/dL    Sodium 136 136 - 145 mmol/L    Potassium 3.9 3.5 - 5.2 mmol/L    Chloride 99 98 - 107 mmol/L    CO2 25.0 22.0 - 29.0 mmol/L    Calcium 9.5 8.6 - 10.5 mg/dL    Total Protein 7.4 6.0 - 8.5 g/dL    Albumin 4.70 3.50 - 5.20 g/dL    ALT (SGPT) 9 1 - 33 U/L    AST (SGOT) 18 1 - 32 U/L    Alkaline Phosphatase 40 39 - 117 U/L    Total Bilirubin 0.3 0.2 - 1.2 mg/dL    eGFR Non African Amer 79 >60 mL/min/1.73    Globulin 2.7 gm/dL    A/G Ratio 1.7 g/dL    BUN/Creatinine Ratio 13.8 7.0 - 25.0    Anion Gap 12.0 5.0 - 15.0 mmol/L   Lipase    Collection Time: 01/12/20  6:55 PM   Result Value Ref Range    Lipase 15 13 - 60 U/L   BNP    Collection Time: 01/12/20  6:55 PM   Result Value Ref Range    proBNP 40.6 5.0 - 450.0 pg/mL   Light Blue Top    Collection Time: 01/12/20  6:55 PM   Result Value Ref Range    Extra Tube hold for add-on    Green Top (Gel)    Collection Time: 01/12/20  6:55 PM   Result Value Ref Range    Extra Tube Hold for add-ons.    Lavender Top    Collection Time: 01/12/20  6:55 PM   Result Value Ref Range    Extra Tube hold for add-on    Gold Top - SST    Collection Time: 01/12/20  6:55 PM   Result Value Ref Range    Extra Tube Hold for " add-ons.    CBC Auto Differential    Collection Time: 01/12/20  6:55 PM   Result Value Ref Range    WBC 7.08 3.40 - 10.80 10*3/mm3    RBC 4.50 3.77 - 5.28 10*6/mm3    Hemoglobin 13.6 12.0 - 15.9 g/dL    Hematocrit 40.0 34.0 - 46.6 %    MCV 88.9 79.0 - 97.0 fL    MCH 30.2 26.6 - 33.0 pg    MCHC 34.0 31.5 - 35.7 g/dL    RDW 12.8 12.3 - 15.4 %    RDW-SD 41.8 37.0 - 54.0 fl    MPV 9.9 6.0 - 12.0 fL    Platelets 151 140 - 450 10*3/mm3    Neutrophil % 42.0 (L) 42.7 - 76.0 %    Lymphocyte % 46.3 (H) 19.6 - 45.3 %    Monocyte % 7.9 5.0 - 12.0 %    Eosinophil % 3.2 0.3 - 6.2 %    Basophil % 0.3 0.0 - 1.5 %    Immature Grans % 0.3 0.0 - 0.5 %    Neutrophils, Absolute 2.97 1.70 - 7.00 10*3/mm3    Lymphocytes, Absolute 3.28 (H) 0.70 - 3.10 10*3/mm3    Monocytes, Absolute 0.56 0.10 - 0.90 10*3/mm3    Eosinophils, Absolute 0.23 0.00 - 0.40 10*3/mm3    Basophils, Absolute 0.02 0.00 - 0.20 10*3/mm3    Immature Grans, Absolute 0.02 0.00 - 0.05 10*3/mm3    nRBC 0.3 (H) 0.0 - 0.2 /100 WBC   POCT pregnancy, urine    Collection Time: 01/12/20  7:43 PM   Result Value Ref Range    HCG, Urine, QL Negative Negative    Lot Number [id number]     Internal Positive Control Positive     Internal Negative Control Negative    Troponin    Collection Time: 01/12/20  9:06 PM   Result Value Ref Range    Troponin T <0.010 0.000 - 0.030 ng/mL     Note: In addition to lab results from this visit, the labs listed above may include labs taken at another facility or during a different encounter within the last 24 hours. Please correlate lab times with ED admission and discharge times for further clarification of the services performed during this visit.    CT Angiogram Chest   Final Result      1. There is no evidence for thoracic aortic dissection or thoracic aortic aneurysm.   2. There is no evidence for pulmonary embolism. Please note the study is protocoled as a thoracic dissection evaluation rather than a pulmonary embolism evaluation.   3. Otherwise  unremarkable exam.      Signer Name: Briana Walden MD    Signed: 1/12/2020 8:24 PM    Workstation Name: [facility]     [facility]      XR Chest 1 View   Final Result   No acute cardiopulmonary findings.      Signer Name: Briana Walden MD    Signed: 1/12/2020 8:15 PM    [email or web address]     [facility]        Vitals:    01/12/20 2129 01/12/20 2130 01/12/20 2145 01/12/20 2202   BP: 114/72 114/72 104/51 111/77   BP Location:    Right arm   Patient Position:    Lying   Pulse: 70 67 72 72   Resp:    16   Temp:       TempSrc:       SpO2: 95% 95% 96% 96%   Weight:       Height:         Medications   sodium chloride 0.9 % flush 10 mL (has no administration in time range)   nitroglycerin (NITROSTAT) SL tablet 0.4 mg (0 mg Sublingual Hold 1/12/20 2153)   aspirin chewable tablet 324 mg (324 mg Oral Given 1/12/20 1905)   iopamidol (ISOVUE-370) 76 % injection 100 mL (80 mL Intravenous Given 1/12/20 2008)   ketorolac (TORADOL) injection 15 mg (15 mg Intravenous Given 1/12/20 2053)     ECG/EMG Results (last 24 hours)     Procedure Component Value Units Date/Time    ECG 12 Lead [[id number]] Collected:  01/12/20 1852     Updated:  01/12/20 1957    Narrative:       Test Reason : cp  Blood Pressure : **/** mmHG  Vent. Rate : 093 BPM     Atrial Rate : 093 BPM     P-R Int : 142 ms          QRS Dur : 072 ms      QT Int : 354 ms       P-R-T Axes : 066 078 015 degrees     QTc Int : 440 ms    Normal sinus rhythm  ST abnormality, possible digitalis effect  Abnormal ECG  No previous ECGs available  Confirmed by MD Case Michael ([id number]) on 1/12/2020 7:57:39 PM    Referred By: MD Clemons           Confirmed By:Casey Case MD    ECG 12 Lead [[id number]] Collected:  01/12/20 2102     Updated:  01/12/20 2103        ECG 12 Lead         ECG 12 Lead   Final Result   Test Reason : cp   Blood Pressure : **/** mmHG   Vent. Rate : 093 BPM     Atrial Rate : 093 BPM      P-R Int : 142 ms          QRS Dur  : 072 ms       QT Int : 354 ms       P-R-T Axes : 066 078 015 degrees      QTc Int : 440 ms      Normal sinus rhythm   ST abnormality, possible digitalis effect   Abnormal ECG   No previous ECGs available   Confirmed by MD Case Michael ([id number]) on 1/12/2020 7:57:39 PM      Referred By: MD Clemons           Confirmed By:Casey Case MD                [facility]    Final diagnoses:   Chest pain, unspecified type       Documentation assistance provided by dmitry Montez.  Information recorded by the scribe was done at my direction and has been verified and validated by me.     Nicolette Montez  01/12/20 6121       Harsh Case MD  01/12/20 7303

## 2020-01-13 NOTE — DISCHARGE INSTRUCTIONS
Follow up with your primary care provider in one week.    Follow up with one of the [facility] Primary Care Providers below to setup primary care. If you need assistance coordinating a primary care appointment with a [facility] Primary Care Provider, please contact the Primary Care Coordinators at [phone] for appointment scheduling.    [facility], Primary Care   [address], [address]   [place], [place] [address] [phone]    [facility]  [address], [address]  [place] [address] [phone]    [facility]  [address], [address]   [place] [address] [phone]    [facility]  [address], [address]  [place] [address]  [phone]    [facility],   [address], [address]   [place], [place] [id number] [phone]    [facility]  [address]  [place], [place] [id number] [phone]    [facility]  [address], [address]  [place], [place] [address] [phone]    [facility]  [address], [address]   [place] [address] [phone]    [facility], Primary Care  [address] [place] [address] [phone]      [facility]  [address], [address]   [place] [address] [phone]    [facility]  [address] [place] [address] [place] [address] [phone]

## 2023-05-04 PROBLEM — B18.2 CHRONIC HEPATITIS C WITHOUT HEPATIC COMA: Status: ACTIVE | Noted: 2023-05-04

## 2023-05-04 PROBLEM — M65.4 DE QUERVAIN'S TENOSYNOVITIS, RIGHT: Status: ACTIVE | Noted: 2023-05-04

## 2023-05-04 PROBLEM — J30.1 SEASONAL ALLERGIC RHINITIS DUE TO POLLEN: Status: ACTIVE | Noted: 2023-05-04

## 2023-05-04 PROBLEM — K59.09 OTHER CONSTIPATION: Status: ACTIVE | Noted: 2023-05-04

## 2023-05-04 PROBLEM — E66.3 OVERWEIGHT (BMI 25.0-29.9): Status: ACTIVE | Noted: 2023-05-04

## 2023-05-04 PROBLEM — F11.21 OPIOID DEPENDENCE IN REMISSION: Status: ACTIVE | Noted: 2023-05-04

## 2023-05-04 PROBLEM — M54.2 CERVICALGIA: Status: ACTIVE | Noted: 2023-05-04

## 2023-05-04 PROBLEM — F17.210 CIGARETTE SMOKER: Status: ACTIVE | Noted: 2023-05-04

## 2023-05-05 ENCOUNTER — OFFICE VISIT (OUTPATIENT)
Dept: FAMILY MEDICINE CLINIC | Facility: CLINIC | Age: 48
End: 2023-05-05
Payer: MEDICAID

## 2023-05-05 VITALS
BODY MASS INDEX: 27.82 KG/M2 | DIASTOLIC BLOOD PRESSURE: 80 MMHG | OXYGEN SATURATION: 98 % | HEART RATE: 71 BPM | HEIGHT: 65 IN | WEIGHT: 167 LBS | TEMPERATURE: 97.6 F | SYSTOLIC BLOOD PRESSURE: 138 MMHG

## 2023-05-05 DIAGNOSIS — R35.1 NOCTURIA: ICD-10-CM

## 2023-05-05 DIAGNOSIS — E66.3 OVERWEIGHT (BMI 25.0-29.9): ICD-10-CM

## 2023-05-05 DIAGNOSIS — F11.21 OPIOID DEPENDENCE IN REMISSION: ICD-10-CM

## 2023-05-05 DIAGNOSIS — Z86.19 HEPATITIS C VIRUS INFECTION CURED AFTER ANTIVIRAL DRUG THERAPY: ICD-10-CM

## 2023-05-05 DIAGNOSIS — M54.2 CERVICALGIA: ICD-10-CM

## 2023-05-05 DIAGNOSIS — Z00.01 ENCOUNTER FOR GENERAL ADULT MEDICAL EXAMINATION WITH ABNORMAL FINDINGS: Primary | ICD-10-CM

## 2023-05-05 DIAGNOSIS — Z13.1 SCREENING FOR DIABETES MELLITUS: ICD-10-CM

## 2023-05-05 DIAGNOSIS — R00.2 PALPITATIONS: ICD-10-CM

## 2023-05-05 DIAGNOSIS — Z12.11 SCREEN FOR COLON CANCER: ICD-10-CM

## 2023-05-05 DIAGNOSIS — Z12.31 ENCOUNTER FOR SCREENING MAMMOGRAM FOR MALIGNANT NEOPLASM OF BREAST: ICD-10-CM

## 2023-05-05 DIAGNOSIS — K02.9 DENTAL CARIES: ICD-10-CM

## 2023-05-05 DIAGNOSIS — F17.211 CIGARETTE NICOTINE DEPENDENCE IN REMISSION: ICD-10-CM

## 2023-05-05 DIAGNOSIS — G56.03 BILATERAL CARPAL TUNNEL SYNDROME: ICD-10-CM

## 2023-05-05 DIAGNOSIS — R06.09 DYSPNEA ON EXERTION: ICD-10-CM

## 2023-05-05 DIAGNOSIS — J30.1 SEASONAL ALLERGIC RHINITIS DUE TO POLLEN: ICD-10-CM

## 2023-05-05 RX ORDER — CETIRIZINE HYDROCHLORIDE 10 MG/1
10 TABLET ORAL DAILY
COMMUNITY

## 2023-05-05 RX ORDER — BUPRENORPHINE HYDROCHLORIDE AND NALOXONE HYDROCHLORIDE DIHYDRATE 8; 2 MG/1; MG/1
TABLET SUBLINGUAL
COMMUNITY
Start: 2023-05-03

## 2023-05-05 NOTE — PROGRESS NOTES
"     Female Physical Note      Date: 2023   Patient Name: Rebekah Saucedo  : 1975   MRN: [id number]     Chief Complaint:    Chief Complaint   Patient presents with   • Annual Exam       History of Present Illness: Rebekah Saucedo is a 48 y.o. female who is here today for their annual health maintenance and physical.  Patient describes her last couple months noting that her heart \"beats loud\" as she can best describe at nighttime, lasting 5 or minutes or so, but not particularly fast and no associated chest pain or dyspnea without scenario, no dizziness, though she also notes a 5 to 6-month history of a sense of some dyspnea upon exertion such as when she climbs stairs but not with routine activities otherwise, no cough or wheeze and no edema.  She does have seasonal allergies for which she will occasionally take a Zyrtec, previous GERD no longer symptomatic no longer on omeprazole, does have some nocturia but no dysuria hematuria frequency or incontinence and no vaginal pain or discharge, having regular menses monthly lasting 2 days moderately heavy, does have some intermittent cervicalgia, no other pain, does have some numbness and tingling in her hands especially after waking at night with previous diagnosis of carpal tunnel syndrome, past due for Pap smear most recently from 2017, never had a mammogram, has never had colon cancer screening with no family history of colon cancer.  She does have a history of hepatitis C which was treated successfully with Dr. Tejeda, indicating she has been cured.      Subjective      Review of Systems:   Review of Systems    Past Medical History, Social History, Family History and Care Team were all reviewed with patient and updated as appropriate.     Medications:     Current Outpatient Medications:   •  buprenorphine-naloxone (SUBOXONE) 8-2 MG per SL tablet, , Disp: , Rfl:   •  cetirizine (zyrTEC) 10 MG tablet, Take 1 tablet by mouth Daily., Disp: , Rfl:   •  " ferrous sulfate 325 (65 FE) MG tablet, Take 1 tablet by mouth Daily With Breakfast., Disp: 30 tablet, Rfl: 3    Allergies:   No Known Allergies    Immunizations:  Health Maintenance Summary          Overdue - Hepatitis B (1 of 3 - 3-dose series) Overdue - never done    No completion, postpone, or frequency change history exists for this topic.          Ordered - COLORECTAL CANCER SCREENING (COLONOSCOPY - Every 10 Years) Ordered on 5/5/2023    No completion, postpone, or frequency change history exists for this topic.          Overdue - COVID-19 Vaccine (1) Overdue - never done    No completion, postpone, or frequency change history exists for this topic.          Overdue - TDAP/TD VACCINES (1 - Tdap) Overdue - never done    No completion, postpone, or frequency change history exists for this topic.          Overdue - ANNUAL PHYSICAL (Yearly) Overdue - never done    No completion, postpone, or frequency change history exists for this topic.          Overdue - PAP SMEAR (Every 3 Years) Overdue since 6/29/2019 06/29/2016  SCANNED - PAP SMEAR    06/15/2016  IGP, CtNg, rfx Aptima HPV ASCU          INFLUENZA VACCINE (Yearly - August to March) Next due on 8/1/2023    No completion, postpone, or frequency change history exists for this topic.          HEPATITIS C SCREENING  Completed    05/04/2023  Prob Dx: Chronic hepatitis C without hepatic coma    02/08/2017  Hepatitis C Antibody    02/08/2017  HCV RT-PCR,Quant(Non-Graph)          Pneumococcal Vaccine 0-64 (Series Information) Aged Out    No completion, postpone, or frequency change history exists for this topic.                 No orders of the defined types were placed in this encounter.       Colorectal Screening:   Cologuard referral pending  Last Completed Colonoscopy     This patient has no relevant Health Maintenance data.        Pap: Referral to GYN pending  Last Completed Pap Smear     This patient has no relevant Health Maintenance data.         Mammogram:  "Referral pending  Last Completed Mammogram     This patient has no relevant Health Maintenance data.           CT for Smoker (Age 50-80, 20 pk yr):   N/A  Bone Density/DEXA (Age 65 or high risk): N/A  Hep C (Age 18-79 once): Previous hep C treated successfully  HIV (Age 15-65 once): No results found for: HIV1X2 previous negative  A1c: No results found for: HGBA1C pending  Lipid panel:  No results found for: LIPIDEXCLUSI pending    The ASCVD Risk score (Valerio HINDS, et al., 2019) failed to calculate for the following reasons:    Cannot find a previous HDL lab    Cannot find a previous total cholesterol lab    Dermatology: N/A  Ophthalmologist: Recommended follow-up  Dentist: Follow-up ongoing    Tobacco Use: Medium Risk   • Smoking Tobacco Use: Former   • Smokeless Tobacco Use: Never   • Passive Exposure: Not on file       Social History     Substance and Sexual Activity   Alcohol Use No        Social History     Substance and Sexual Activity   Drug Use No    Comment: clean for 4 years        Diet/Physical activity: Pursuing healthy diet, some exercise    Sexual Health: Not using or requiring contraception, not attempting pregnancy   Menopause: N/A  Menstrual Cycles: Monthly, last menstrual cycle: Within the last month    Depression: PHQ-2 Depression Screening  PHQ-9 Total Score: 0       Smoking Cessation:   Ex-smoker abstaining for 2 years Has stopped smoking    Objective     Physical Exam:  Vital Signs:   Vitals:    05/05/23 0911   BP: 138/80   BP Location: Left arm   Patient Position: Sitting   Cuff Size: Adult   Pulse: 71   Temp: 97.6 °F (36.4 °C)   TempSrc: Temporal   SpO2: 98%   Weight: 75.8 kg (167 lb)   Height: 165.1 cm (65\")     Body mass index is 27.79 kg/m².     Physical Exam  Vitals and nursing note reviewed.   Constitutional:       Appearance: Normal appearance.      Comments: Healthy alert well spoken, very minimally overweight   HENT:      Head: Normocephalic and atraumatic.      Right Ear: Tympanic " membrane, ear canal and external ear normal.      Left Ear: Tympanic membrane, ear canal and external ear normal.      Nose: Nose normal.      Mouth/Throat:      Mouth: Mucous membranes are moist.      Pharynx: Oropharynx is clear.      Comments: Upper denture which patient declines to remove given adhesive indicating no underlying abnormalities, residual anterior mandibular dentition with some decay and gingivitis, no mucosal abnormalities otherwise  Eyes:      Extraocular Movements: Extraocular movements intact.      Conjunctiva/sclera: Conjunctivae normal.      Pupils: Pupils are equal, round, and reactive to light.   Neck:      Vascular: No carotid bruit.      Comments: No cervical adenopathy or masses, no significant tenderness, mild decreased range of motion to rotation and extension with good flexion, no periclavicular, axillary, or inguinal adenopathy  Cardiovascular:      Rate and Rhythm: Normal rate and regular rhythm.      Pulses: Normal pulses.      Heart sounds: Normal heart sounds. No murmur heard.    No friction rub. No gallop.      Comments: 2+ carotids without bruits, 2+ radial pulses, 2+ femoral pulses without bruits, 2+ bipedal pulses with good perfusion and no edema  Pulmonary:      Effort: Pulmonary effort is normal.      Breath sounds: Normal breath sounds.      Comments: No cough wheeze or dyspnea, good airflow  Chest:      Comments: Breast exam deferred as to be referred to GYN  Abdominal:      General: Bowel sounds are normal.      Palpations: Abdomen is soft.      Comments: Nontender nondistended with no organomegaly or masses   Genitourinary:     Comments: Breast pelvic and rectal exams deferred as to be referred to GYN  Musculoskeletal:         General: No swelling, tenderness or deformity. Normal range of motion.      Cervical back: Normal range of motion. Rigidity present. No tenderness.      Right lower leg: No edema.      Left lower leg: No edema.   Lymphadenopathy:      Cervical: No  cervical adenopathy.   Skin:     General: Skin is warm and dry.      Capillary Refill: Capillary refill takes less than 2 seconds.      Findings: No lesion or rash.      Comments: Tattoo base of neck   Neurological:      General: No focal deficit present.      Mental Status: She is alert and oriented to person, place, and time. Mental status is at baseline.      Cranial Nerves: No cranial nerve deficit.      Sensory: No sensory deficit.      Motor: No weakness.      Coordination: Coordination normal.      Comments: Positive Phalen sign on bilateral wrist, negative Tinel's sign   Psychiatric:         Mood and Affect: Mood normal.         Behavior: Behavior normal.         Thought Content: Thought content normal.         Judgment: Judgment normal.         POCT Results (if applicable);   Results for orders placed or performed during the hospital encounter of 01/12/20   Troponin    Specimen: Blood   Result Value Ref Range    Troponin T <0.010 0.000 - 0.030 ng/mL   Comprehensive Metabolic Panel    Specimen: Blood   Result Value Ref Range    Glucose 76 65 - 99 mg/dL    BUN 11 6 - 20 mg/dL    Creatinine 0.80 0.57 - 1.00 mg/dL    Sodium 136 136 - 145 mmol/L    Potassium 3.9 3.5 - 5.2 mmol/L    Chloride 99 98 - 107 mmol/L    CO2 25.0 22.0 - 29.0 mmol/L    Calcium 9.5 8.6 - 10.5 mg/dL    Total Protein 7.4 6.0 - 8.5 g/dL    Albumin 4.70 3.50 - 5.20 g/dL    ALT (SGPT) 9 1 - 33 U/L    AST (SGOT) 18 1 - 32 U/L    Alkaline Phosphatase 40 39 - 117 U/L    Total Bilirubin 0.3 0.2 - 1.2 mg/dL    eGFR Non African Amer 79 >60 mL/min/1.73    Globulin 2.7 gm/dL    A/G Ratio 1.7 g/dL    BUN/Creatinine Ratio 13.8 7.0 - 25.0    Anion Gap 12.0 5.0 - 15.0 mmol/L   Lipase    Specimen: Blood   Result Value Ref Range    Lipase 15 13 - 60 U/L   BNP    Specimen: Blood   Result Value Ref Range    proBNP 40.6 5.0 - 450.0 pg/mL   CBC Auto Differential    Specimen: Blood   Result Value Ref Range    WBC 7.08 3.40 - 10.80 10*3/mm3    RBC 4.50 3.77 - 5.28  10*6/mm3    Hemoglobin 13.6 12.0 - 15.9 g/dL    Hematocrit 40.0 34.0 - 46.6 %    MCV 88.9 79.0 - 97.0 fL    MCH 30.2 26.6 - 33.0 pg    MCHC 34.0 31.5 - 35.7 g/dL    RDW 12.8 12.3 - 15.4 %    RDW-SD 41.8 37.0 - 54.0 fl    MPV 9.9 6.0 - 12.0 fL    Platelets 151 140 - 450 10*3/mm3    Neutrophil % 42.0 (L) 42.7 - 76.0 %    Lymphocyte % 46.3 (H) 19.6 - 45.3 %    Monocyte % 7.9 5.0 - 12.0 %    Eosinophil % 3.2 0.3 - 6.2 %    Basophil % 0.3 0.0 - 1.5 %    Immature Grans % 0.3 0.0 - 0.5 %    Neutrophils, Absolute 2.97 1.70 - 7.00 10*3/mm3    Lymphocytes, Absolute 3.28 (H) 0.70 - 3.10 10*3/mm3    Monocytes, Absolute 0.56 0.10 - 0.90 10*3/mm3    Eosinophils, Absolute 0.23 0.00 - 0.40 10*3/mm3    Basophils, Absolute 0.02 0.00 - 0.20 10*3/mm3    Immature Grans, Absolute 0.02 0.00 - 0.05 10*3/mm3    nRBC 0.3 (H) 0.0 - 0.2 /100 WBC   Troponin    Specimen: Blood   Result Value Ref Range    Troponin T <0.010 0.000 - 0.030 ng/mL   POCT pregnancy, urine    Specimen: Urine   Result Value Ref Range    HCG, Urine, QL Negative Negative    Lot Number [id number]     Internal Positive Control Positive     Internal Negative Control Negative    Light Blue Top   Result Value Ref Range    Extra Tube hold for add-on    Green Top (Gel)   Result Value Ref Range    Extra Tube Hold for add-ons.    Lavender Top   Result Value Ref Range    Extra Tube hold for add-on    Gold Top - SST   Result Value Ref Range    Extra Tube Hold for add-ons.           ECG 12 Lead    Date/Time: 5/5/2023 10:01 AM  Performed by: Dave Joseph MD  Authorized by: Dave Joseph MD   Comparison: compared with previous ECG from 1/14/2020  Similar to previous ECG  Comparison to previous ECG: Sinus bradycardia rate 53 with no acute or chronic abnormalities noted otherwise, no significant change versus prior tracing other than mild bradycardia              Assessment / Plan      Assessment/Plan:   Diagnoses and all orders for this visit:    1. Encounter for general adult  medical examination with abnormal findings (Primary)  -     ECG 12 Lead  -     Cologuard - Stool, Per Rectum; Future  -     Ambulatory Referral to Obstetrics / Gynecology  -     TSH Rfx On Abnormal To Free T4; Future  -     CBC & Differential; Future  -     Comprehensive Metabolic Panel; Future  -     Hemoglobin A1c; Future  -     Lipid Panel; Future  -     Vitamin D,25-Hydroxy; Future  -     Urinalysis With Culture If Indicated -; Future  -     XR Chest PA & Lateral; Future  -     Ambulatory Referral to Cardiology  -     Mammo Screening Bilateral With CAD; Future  Patient been referred to GYN for related update including Pap smear and breast exam, mammogram referral given, referred to cardiology given some dyspnea on exertion with some vague palpitations, and colon cancer screening referral for Cologuard, patient choosing this over colonoscopy.  She declines recommendation for Prevnar 20 and Tdap, advised to obtain COVID-19 bivalent booster, obtain flu vaccine each fall.  2. Palpitations  -     ECG 12 Lead  -     TSH Rfx On Abnormal To Free T4; Future  -     Comprehensive Metabolic Panel; Future  -     Ambulatory Referral to Cardiology  EKG today revealing mild bradycardia otherwise no abnormalities.  Refer to cardiology, check related labs including TSH.  3. Dyspnea on exertion  -     ECG 12 Lead  -     CBC & Differential; Future  -     XR Chest PA & Lateral; Future  -     Ambulatory Referral to Cardiology  Benign exam.  Referring to cardiology, EKG with mild bradycardia otherwise unremarkable, check related labs, check chest x-ray.  4. Bilateral carpal tunnel syndrome  Mild symptoms, advised use of wrist splints at night.  Advise if not improving  5. Seasonal allergic rhinitis due to pollen  Using Zyrtec OTC.  6. Nocturia  -     Urinalysis With Culture If Indicated -; Future  No related dysuria hematuria urgency or incontinence.  Check UA.  7. Hepatitis C virus infection cured after antiviral drug therapy  Status  post completion of Mayvet therapy through Dr. Estrada of GI, patient reporting had documented clearance.  8. Overweight (BMI 25.0-29.9)  -     Lipid Panel; Future  -     Vitamin D,25-Hydroxy; Future  Emphasize healthy lifestyle with diet exercise and further efforts at weight loss  9. Cervicalgia  Mild chronic symptoms.  Tylenol or equivalent.  10. Dental caries  Upper dentures with residual anterior mandibular dentition having dental caries.  She has regular dental follow-up with plan for repair  11. Opioid dependence in remission  Currently on Suboxone.  Doing well per patient.  12. Cigarette nicotine dependence in remission  Abstaining from smoking for 2 years, previous total cigarette habit 0.5 to 0.75 packs/day x 15 years.  Would not qualify for need of lung cancer screening.  13. Screening for diabetes mellitus  -     Hemoglobin A1c; Future    14. Screen for colon cancer  -     Cologuard - Stool, Per Rectum; Future  Patient using Cologuard screening over colonoscopy.  Referral made  15. Encounter for screening mammogram for malignant neoplasm of breast  -     Mammo Screening Bilateral With CAD; Future  Referral for mammogram       Healthcare Maintenance:  Counseling provided based on age appropriate USPSTF guidelines.  BMI is >= 25 and <30. (Overweight) The following options were offered after discussion;: exercise counseling/recommendations and nutrition counseling/recommendations    Rebekah JEANA Saucedo voices understanding and acceptance of this advice and will call back with any further questions or concerns. AVS with preventive healthcare tips printed for patient.       Follow Up:   Return in about 1 year (around 5/5/2024) for Annual physical, Labs today.      At [facility], we believe that sharing information builds trust and better relationships. You are receiving this note because you recently visited [facility]. It is possible you will see health information before a provider has talked with you  about it. This kind of information can be easy to misunderstand. To help you fully understand what it means for your health, we urge you to discuss this note with your provider.    Dave Joseph MD  [facility]

## 2023-05-05 NOTE — PROGRESS NOTES
Venipuncture Blood Specimen Collection  Venipuncture performed in right arm by Cecy Alejo MA with good hemostasis. Patient tolerated the procedure well without complications.   05/05/23   Cecy Alejo MA

## 2023-05-06 LAB
25(OH)D3+25(OH)D2 SERPL-MCNC: 24.8 NG/ML (ref 30–100)
ALBUMIN SERPL-MCNC: 4.5 G/DL (ref 3.8–4.8)
ALBUMIN/GLOB SERPL: 1.7 {RATIO} (ref 1.2–2.2)
ALP SERPL-CCNC: 45 IU/L (ref 44–121)
ALT SERPL-CCNC: 11 IU/L (ref 0–32)
AST SERPL-CCNC: 18 IU/L (ref 0–40)
BASOPHILS # BLD AUTO: 0 X10E3/UL (ref 0–0.2)
BASOPHILS NFR BLD AUTO: 1 %
BILIRUB SERPL-MCNC: 0.5 MG/DL (ref 0–1.2)
BUN SERPL-MCNC: 9 MG/DL (ref 6–24)
BUN/CREAT SERPL: 11 (ref 9–23)
CALCIUM SERPL-MCNC: 8.9 MG/DL (ref 8.7–10.2)
CHLORIDE SERPL-SCNC: 103 MMOL/L (ref 96–106)
CHOLEST SERPL-MCNC: 149 MG/DL (ref 100–199)
CO2 SERPL-SCNC: 22 MMOL/L (ref 20–29)
CREAT SERPL-MCNC: 0.81 MG/DL (ref 0.57–1)
EGFRCR SERPLBLD CKD-EPI 2021: 91 ML/MIN/1.73
EOSINOPHIL # BLD AUTO: 0.1 X10E3/UL (ref 0–0.4)
EOSINOPHIL NFR BLD AUTO: 3 %
ERYTHROCYTE [DISTWIDTH] IN BLOOD BY AUTOMATED COUNT: 12.1 % (ref 11.7–15.4)
GLOBULIN SER CALC-MCNC: 2.7 G/DL (ref 1.5–4.5)
GLUCOSE SERPL-MCNC: 98 MG/DL (ref 70–99)
HBA1C MFR BLD: 5.3 % (ref 4.8–5.6)
HCT VFR BLD AUTO: 38.7 % (ref 34–46.6)
HDLC SERPL-MCNC: 75 MG/DL
HGB BLD-MCNC: 12.9 G/DL (ref 11.1–15.9)
IMM GRANULOCYTES # BLD AUTO: 0 X10E3/UL (ref 0–0.1)
IMM GRANULOCYTES NFR BLD AUTO: 0 %
LDLC SERPL CALC-MCNC: 63 MG/DL (ref 0–99)
LYMPHOCYTES # BLD AUTO: 1.5 X10E3/UL (ref 0.7–3.1)
LYMPHOCYTES NFR BLD AUTO: 39 %
MCH RBC QN AUTO: 28.5 PG (ref 26.6–33)
MCHC RBC AUTO-ENTMCNC: 33.3 G/DL (ref 31.5–35.7)
MCV RBC AUTO: 85 FL (ref 79–97)
MONOCYTES # BLD AUTO: 0.3 X10E3/UL (ref 0.1–0.9)
MONOCYTES NFR BLD AUTO: 7 %
NEUTROPHILS # BLD AUTO: 2 X10E3/UL (ref 1.4–7)
NEUTROPHILS NFR BLD AUTO: 50 %
PLATELET # BLD AUTO: 223 X10E3/UL (ref 150–450)
POTASSIUM SERPL-SCNC: 4.5 MMOL/L (ref 3.5–5.2)
PROT SERPL-MCNC: 7.2 G/DL (ref 6–8.5)
RBC # BLD AUTO: 4.53 X10E6/UL (ref 3.77–5.28)
SODIUM SERPL-SCNC: 139 MMOL/L (ref 134–144)
TRIGL SERPL-MCNC: 49 MG/DL (ref 0–149)
TSH SERPL DL<=0.005 MIU/L-ACNC: 1.82 UIU/ML (ref 0.45–4.5)
VLDLC SERPL CALC-MCNC: 11 MG/DL (ref 5–40)
WBC # BLD AUTO: 3.9 X10E3/UL (ref 3.4–10.8)

## 2023-05-09 LAB
APPEARANCE UR: CLEAR
BACTERIA #/AREA URNS HPF: ABNORMAL /[HPF]
BACTERIA UR CULT: NORMAL
BACTERIA UR CULT: NORMAL
BILIRUB UR QL STRIP: NEGATIVE
CASTS URNS QL MICRO: ABNORMAL /LPF
COLOR UR: YELLOW
EPI CELLS #/AREA URNS HPF: ABNORMAL /HPF (ref 0–10)
GLUCOSE UR QL STRIP: NEGATIVE
HGB UR QL STRIP: NEGATIVE
KETONES UR QL STRIP: NEGATIVE
LEUKOCYTE ESTERASE UR QL STRIP: ABNORMAL
MICRO URNS: ABNORMAL
NITRITE UR QL STRIP: NEGATIVE
PH UR STRIP: 5 [PH] (ref 5–7.5)
PROT UR QL STRIP: NEGATIVE
RBC #/AREA URNS HPF: ABNORMAL /HPF (ref 0–2)
SP GR UR STRIP: 1.02 (ref 1–1.03)
URINALYSIS REFLEX: ABNORMAL
UROBILINOGEN UR STRIP-MCNC: 1 MG/DL (ref 0.2–1)
WBC #/AREA URNS HPF: ABNORMAL /HPF (ref 0–5)

## 2023-05-10 ENCOUNTER — TELEPHONE (OUTPATIENT)
Dept: FAMILY MEDICINE CLINIC | Facility: CLINIC | Age: 48
End: 2023-05-10
Payer: MEDICAID

## 2023-05-10 NOTE — TELEPHONE ENCOUNTER
----- Message from Dave Joseph MD sent at 5/9/2023  5:54 PM EDT -----  Please notify patient of recent lab testing revealing a slight vitamin D deficiency, recommending she take vitamin D 1000 IU daily over-the-counter, also noting urine culture revealing contaminated culture thus not true urinary tract infection, remainder of laboratory testing satisfactory including cholesterol profile, diabetic screen, thyroid screen, chemistry profile including electrolytes liver testing and kidney function in the blood, and CBC including testing for anemia.

## 2023-05-24 ENCOUNTER — OFFICE VISIT (OUTPATIENT)
Dept: CARDIOLOGY | Facility: CLINIC | Age: 48
End: 2023-05-24
Payer: MEDICAID

## 2023-05-24 ENCOUNTER — TELEPHONE (OUTPATIENT)
Dept: CARDIOLOGY | Facility: CLINIC | Age: 48
End: 2023-05-24

## 2023-05-24 VITALS
OXYGEN SATURATION: 97 % | BODY MASS INDEX: 27.66 KG/M2 | SYSTOLIC BLOOD PRESSURE: 132 MMHG | WEIGHT: 166 LBS | HEIGHT: 65 IN | DIASTOLIC BLOOD PRESSURE: 72 MMHG | HEART RATE: 75 BPM

## 2023-05-24 DIAGNOSIS — R00.2 PALPITATIONS: ICD-10-CM

## 2023-05-24 DIAGNOSIS — F41.1 GAD (GENERALIZED ANXIETY DISORDER): ICD-10-CM

## 2023-05-24 RX ORDER — ESCITALOPRAM OXALATE 10 MG/1
10 TABLET ORAL DAILY
Qty: 90 TABLET | Refills: 0 | Status: SHIPPED | OUTPATIENT
Start: 2023-05-24

## 2023-05-24 RX ORDER — MELATONIN
1000 DAILY
COMMUNITY
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
## 2023-05-24 NOTE — TELEPHONE ENCOUNTER
ZAHRAA FROM [facility] CALLED AND LEFT A VOICEMAIL REGARDING A PRESCRIPTION THEY JUST RECEIVED. PLEASE CALL BACK [phone].

## 2023-05-25 NOTE — TELEPHONE ENCOUNTER
Spoke with Frannie,pharmacist, @ [facility] in [place].  Read back Anh's written instructions on her Lexapro.

## 2023-05-26 ENCOUNTER — TELEPHONE (OUTPATIENT)
Dept: FAMILY MEDICINE CLINIC | Facility: CLINIC | Age: 48
End: 2023-05-26
Payer: MEDICAID

## 2023-05-26 NOTE — TELEPHONE ENCOUNTER
----- Message from Dave Joseph MD sent at 5/26/2023 12:32 PM EDT -----  Please advise patient her recent chest x-ray revealed no acute abnormalities.  She is to follow-up with cardiology as referred.

## 2023-05-31 ENCOUNTER — TELEPHONE (OUTPATIENT)
Dept: FAMILY MEDICINE CLINIC | Facility: CLINIC | Age: 48
End: 2023-05-31

## 2023-05-31 NOTE — TELEPHONE ENCOUNTER
----- Message from Dave Joseph MD sent at 5/30/2023 12:58 PM EDT -----  Please advise patient that her Cologuard screen for colon cancer was negative/normal.  She should repeat the study in 3 years.

## 2023-07-24 DIAGNOSIS — F41.1 GAD (GENERALIZED ANXIETY DISORDER): ICD-10-CM

## 2023-07-24 DIAGNOSIS — R00.2 PALPITATIONS: ICD-10-CM

## 2023-07-24 RX ORDER — ESCITALOPRAM OXALATE 10 MG/1
10 TABLET ORAL DAILY
Qty: 90 TABLET | Refills: 3 | Status: SHIPPED | OUTPATIENT
Start: 2023-07-24

## 2023-08-11 ENCOUNTER — OFFICE VISIT (OUTPATIENT)
Dept: CARDIOLOGY | Facility: CLINIC | Age: 48
End: 2023-08-11
Payer: MEDICAID

## 2023-08-11 VITALS
OXYGEN SATURATION: 99 % | SYSTOLIC BLOOD PRESSURE: 109 MMHG | BODY MASS INDEX: 27.32 KG/M2 | DIASTOLIC BLOOD PRESSURE: 74 MMHG | WEIGHT: 164 LBS | HEART RATE: 64 BPM | HEIGHT: 65 IN

## 2023-08-11 DIAGNOSIS — G47.10 HYPERSOMNIA: ICD-10-CM

## 2023-08-11 DIAGNOSIS — D22.9 CHANGE IN COLOR OF SKIN MOLE: ICD-10-CM

## 2023-08-11 DIAGNOSIS — R94.31 ABNORMAL EKG: ICD-10-CM

## 2023-08-11 DIAGNOSIS — M54.2 CERVICALGIA: ICD-10-CM

## 2023-08-11 DIAGNOSIS — R00.2 PALPITATIONS: ICD-10-CM

## 2023-08-11 RX ORDER — PROPRANOLOL HYDROCHLORIDE 10 MG/1
10 TABLET ORAL 2 TIMES DAILY
Qty: 180 TABLET | Refills: 0 | Status: SHIPPED | OUTPATIENT
Start: 2023-08-11

## 2023-08-11 RX ORDER — NALOXONE HYDROCHLORIDE 4 MG/.1ML
SPRAY NASAL
COMMUNITY
Start: 2023-07-05
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
## 2023-09-05 ENCOUNTER — TELEPHONE (OUTPATIENT)
Dept: CARDIOLOGY | Facility: CLINIC | Age: 48
End: 2023-09-05
Payer: MEDICAID

## 2023-09-05 NOTE — TELEPHONE ENCOUNTER
You had sent a referral to [facility] for this patient,  they sent a fax and stated that they do not take her insurance.  Please advise.

## 2023-12-11 ENCOUNTER — OFFICE VISIT (OUTPATIENT)
Dept: CARDIOLOGY | Facility: CLINIC | Age: 48
End: 2023-12-11
Payer: MEDICAID

## 2023-12-11 VITALS
OXYGEN SATURATION: 99 % | HEART RATE: 77 BPM | WEIGHT: 171 LBS | SYSTOLIC BLOOD PRESSURE: 112 MMHG | DIASTOLIC BLOOD PRESSURE: 70 MMHG | HEIGHT: 65 IN | BODY MASS INDEX: 28.49 KG/M2

## 2023-12-11 DIAGNOSIS — R00.2 PALPITATIONS: Primary | ICD-10-CM

## 2023-12-11 DIAGNOSIS — F17.290 OTHER TOBACCO PRODUCT NICOTINE DEPENDENCE, UNCOMPLICATED: ICD-10-CM

## 2023-12-11 PROCEDURE — 1159F MED LIST DOCD IN RCRD: CPT | Performed by: NURSE PRACTITIONER

## 2023-12-11 PROCEDURE — 1160F RVW MEDS BY RX/DR IN RCRD: CPT | Performed by: NURSE PRACTITIONER

## 2023-12-11 PROCEDURE — 99213 OFFICE O/P EST LOW 20 MIN: CPT | Performed by: NURSE PRACTITIONER

## 2023-12-11 RX ORDER — VARENICLINE TARTRATE 0.5 (11)-1
KIT ORAL
Qty: 1 EACH | Refills: 0 | Status: SHIPPED | OUTPATIENT
Start: 2023-12-11 | End: 2024-01-08

## 2023-12-11 RX ORDER — VARENICLINE TARTRATE 1 MG/1
1 TABLET, FILM COATED ORAL 2 TIMES DAILY
Qty: 56 TABLET | Refills: 1 | Status: SHIPPED | OUTPATIENT
Start: 2024-01-08 | End: 2024-03-04

## 2023-12-12 PROBLEM — F17.200 NICOTINE DEPENDENCE: Status: ACTIVE | Noted: 2023-12-12

## 2023-12-12 NOTE — PROGRESS NOTES
[facility] Provider Note     Date:   2023   Name: Rebekah Saucedo  :   1975  PCP: Dave Joseph MD    Chief Complaint   Patient presents with    Palpitations     STRESS RESULTS       Subjective     History of Present Illness  Rebekah Saucedo is a 48 y.o. female who presents today for follow-up on palpitations and recent stress test.  Patient was evaluated in May for worsening palpitations.  She completed a Holter monitor that revealed a relatively benign monitor study, events were associated with sinus tachycardia.  Echocardiogram from 2023 revealed an LVEF of 61-65% and mild mitral regurgitation.  A stress echo was ordered due to continued symptoms and completed on 2023 which revealed a normal stress echo consistent with a low risk study for myocardial ischemia. ST changes noted on ECG with exercise but no symptoms and no wall motion abnormality seen.  Patient reports that palpitations have improved significantly since her last visit in August.  She feels like it was mostly related to stress and anxiety during that time.  She has been vaping with nicotine and would like to take Chantix again to help her quit vaping.  She has taken Chantix previously and did well with that, she quit smoking for several years. She denies any other concerns or complaints today.    Cardiac history  1.  Palpitations/sinus tachycardia  2.  Mild mitral regurgitation    Stress echo 2023-normal stress echo consistent with a low risk study for myocardial ischemia.  ST changes noted on ECG with exercise but no symptoms and no wall motion abnormality seen.    Echocardiogram 2023-LVEF 61-65%.  Left ventricular diastolic function was normal.  RVSP was normal.  Mild mitral regurgitation.    Holter monitor 2023 -relatively benign monitor study, patient events associated with sinus tachycardia.  Primary rhythm was sinus rhythm with an average heart rate of 70 bpm.     Reports  Denies   Chest Pain [] [x]   Shortness of Air [] [x]   Palpitations [] [x]   Edema [] [x]   Dizziness [] [x]   Syncope [] [x]       No Known Allergies    Current Outpatient Medications:     buprenorphine-naloxone (SUBOXONE) 8-2 MG per SL tablet, , Disp: , Rfl:     cholecalciferol (VITAMIN D3) 25 MCG (1000 UT) tablet, Take 1 tablet by mouth Daily., Disp: , Rfl:     ferrous sulfate 325 (65 FE) MG tablet, Take 1 tablet by mouth Daily With Breakfast., Disp: 30 tablet, Rfl: 3    naloxone (NARCAN) 4 MG/0.1ML nasal spray, , Disp: , Rfl:     propranolol (INDERAL) 10 MG tablet, Take 1 tablet by mouth 2 (Two) Times a Day., Disp: 180 tablet, Rfl: 0    [START ON 1/8/2024] varenicline (Chantix Continuing Month Rai) 1 MG tablet, Take 1 tablet by mouth 2 (Two) Times a Day for 56 days., Disp: 56 tablet, Rfl: 1    Varenicline Tartrate, Starter, 0.5 MG X 11 & 1 MG X 42 tablet therapy pack, Take 0.5 mg by mouth Daily for 3 days, THEN 0.5 mg 2 (Two) Times a Day for 4 days, THEN 1 mg 2 (Two) Times a Day for 21 days. Take 0.5 mg po daily x 3 days, then 0.5 mg po bid x 4 days, then 1 mg po bid, Disp: 1 each, Rfl: 0    Past Medical History:   Diagnosis Date    Anemia     Cervical spine fracture     with ex     Endometriosis     10 years ago    Narcotic abuse in remission     IV drug abuse-4 years ago    Urinary tract infection     not with this pregnancy    Varicella      as a child      Past Surgical History:   Procedure Laterality Date    DIAGNOSTIC LAPAROSCOPY      DILATATION AND EVACUATION      ENDOMETRIAL BIOPSY      LAPAROSCOPIC TUBAL LIGATION  07/28/2017    Dr. Tucker Huffman    WISDOM TOOTH EXTRACTION       Family History   Problem Relation Age of Onset    Heart disease Mother     Lung cancer Mother     Diabetes Paternal Uncle      Social History     Socioeconomic History    Marital status:    Tobacco Use    Smoking status: Former     Packs/day: 1.00     Years: 2.00     Additional pack years: 0.00     Total pack years: 2.00  "    Types: Cigarettes     Quit date: 2021     Years since quittin.4     Passive exposure: Past    Smokeless tobacco: Never    Tobacco comments:     trying to quit   Vaping Use    Vaping Use: Some days    Substances: Flavoring    Devices: Disposable   Substance and Sexual Activity    Alcohol use: No    Drug use: No     Comment: clean for 4 years    Sexual activity: Yes     Partners: Male       Objective     Vital Signs:  /70   Pulse 77   Ht 165.1 cm (65\")   Wt 77.6 kg (171 lb)   SpO2 99%   BMI 28.46 kg/m²   Estimated body mass index is 28.46 kg/m² as calculated from the following:    Height as of this encounter: 165.1 cm (65\").    Weight as of this encounter: 77.6 kg (171 lb).               Physical Exam  Vitals reviewed.   Constitutional:       Appearance: Normal appearance.   HENT:      Head: Normocephalic.   Cardiovascular:      Rate and Rhythm: Normal rate and regular rhythm.      Heart sounds: Normal heart sounds.   Pulmonary:      Effort: Pulmonary effort is normal.      Breath sounds: Normal breath sounds.   Musculoskeletal:      Right lower leg: No edema.      Left lower leg: No edema.   Skin:     General: Skin is warm and dry.      Capillary Refill: Capillary refill takes less than 2 seconds.   Neurological:      General: No focal deficit present.      Mental Status: She is alert and oriented to person, place, and time.   Psychiatric:         Mood and Affect: Mood normal.         Behavior: Behavior normal.           Cardiology studies reviewed: Stress echo reviewed          Assessment and Plan     Diagnoses and all orders for this visit:    1. Palpitations (Primary)  Assessment & Plan:  Palpitations have mostly resolved and decreased significantly since last office visit. She completed a Holter monitor that revealed a relatively benign monitor study, events were associated with sinus tachycardia.  Echocardiogram from 2023 revealed an LVEF of 61-65% and mild mitral regurgitation.  A " stress echo was ordered due to continued symptoms and completed on 12/4/2023 which revealed a normal stress echo consistent with a low risk study for myocardial ischemia. ST changes noted on ECG with exercise but no symptoms and no wall motion abnormality seen.       2. Other tobacco product nicotine dependence, uncomplicated  Assessment & Plan:  She has been vaping with nicotine and would like to take Chantix again to help her quit vaping.  She has taken Chantix previously and did well with it without significant side effects. She quit smoking for several years but recently started vaping.    -Trial of chantix     Orders:  -     varenicline (Chantix Continuing Month Rai) 1 MG tablet; Take 1 tablet by mouth 2 (Two) Times a Day for 56 days.  Dispense: 56 tablet; Refill: 1    Other orders  -     Varenicline Tartrate, Starter, 0.5 MG X 11 & 1 MG X 42 tablet therapy pack; Take 0.5 mg by mouth Daily for 3 days, THEN 0.5 mg 2 (Two) Times a Day for 4 days, THEN 1 mg 2 (Two) Times a Day for 21 days. Take 0.5 mg po daily x 3 days, then 0.5 mg po bid x 4 days, then 1 mg po bid  Dispense: 1 each; Refill: 0        Recommendations: Report if any new/changing symptoms immediately and Stop cigarettes          Follow Up  No follow-ups on file.  Patient was given instructions and counseling regarding her condition or for health maintenance advice. Please see specific information pulled into the AVS if appropriate.

## 2023-12-12 NOTE — ASSESSMENT & PLAN NOTE
Palpitations have mostly resolved and decreased significantly since last office visit. She completed a Holter monitor that revealed a relatively benign monitor study, events were associated with sinus tachycardia.  Echocardiogram from 8/11/2023 revealed an LVEF of 61-65% and mild mitral regurgitation.  A stress echo was ordered due to continued symptoms and completed on 12/4/2023 which revealed a normal stress echo consistent with a low risk study for myocardial ischemia. ST changes noted on ECG with exercise but no symptoms and no wall motion abnormality seen.

## 2023-12-12 NOTE — ASSESSMENT & PLAN NOTE
She has been vaping with nicotine and would like to take Chantix again to help her quit vaping.  She has taken Chantix previously and did well with it without significant side effects. She quit smoking for several years but recently started vaping.    -Trial of chantix